# Patient Record
Sex: FEMALE | Race: WHITE | NOT HISPANIC OR LATINO | Employment: UNEMPLOYED | URBAN - METROPOLITAN AREA
[De-identification: names, ages, dates, MRNs, and addresses within clinical notes are randomized per-mention and may not be internally consistent; named-entity substitution may affect disease eponyms.]

---

## 2017-01-30 ENCOUNTER — OFFICE VISIT (OUTPATIENT)
Dept: FAMILY MEDICINE CLINIC | Facility: CLINIC | Age: 67
End: 2017-01-30

## 2017-01-30 VITALS
SYSTOLIC BLOOD PRESSURE: 140 MMHG | TEMPERATURE: 98 F | BODY MASS INDEX: 37.27 KG/M2 | HEIGHT: 64 IN | OXYGEN SATURATION: 96 % | WEIGHT: 218.3 LBS | DIASTOLIC BLOOD PRESSURE: 74 MMHG | HEART RATE: 59 BPM

## 2017-01-30 DIAGNOSIS — E11.8 TYPE 2 DIABETES MELLITUS WITH COMPLICATION, UNSPECIFIED LONG TERM INSULIN USE STATUS: Primary | ICD-10-CM

## 2017-01-30 DIAGNOSIS — E11.8 TYPE 2 DIABETES MELLITUS WITH COMPLICATION, WITH LONG-TERM CURRENT USE OF INSULIN (HCC): ICD-10-CM

## 2017-01-30 DIAGNOSIS — E55.9 VITAMIN D DEFICIENCY: ICD-10-CM

## 2017-01-30 DIAGNOSIS — B37.9 YEAST INFECTION: Primary | ICD-10-CM

## 2017-01-30 DIAGNOSIS — Z79.4 TYPE 2 DIABETES MELLITUS WITH COMPLICATION, WITH LONG-TERM CURRENT USE OF INSULIN (HCC): ICD-10-CM

## 2017-01-30 LAB
EXPIRATION DATE: ABNORMAL
HBA1C MFR BLD: 9.5 %
Lab: ABNORMAL

## 2017-01-30 PROCEDURE — 36416 COLLJ CAPILLARY BLOOD SPEC: CPT | Performed by: NURSE PRACTITIONER

## 2017-01-30 PROCEDURE — 83036 HEMOGLOBIN GLYCOSYLATED A1C: CPT | Performed by: NURSE PRACTITIONER

## 2017-01-30 PROCEDURE — 99214 OFFICE O/P EST MOD 30 MIN: CPT | Performed by: NURSE PRACTITIONER

## 2017-01-30 RX ORDER — FLUCONAZOLE 100 MG/1
100 TABLET ORAL DAILY
Qty: 3 TABLET | Refills: 0 | Status: SHIPPED | OUTPATIENT
Start: 2017-01-30 | End: 2017-02-02

## 2017-01-30 RX ORDER — CLONIDINE HYDROCHLORIDE 0.1 MG/1
0.1 TABLET ORAL 2 TIMES DAILY
COMMUNITY
Start: 2017-01-11 | End: 2017-07-18 | Stop reason: DRUGHIGH

## 2017-01-30 RX ORDER — INSULIN DETEMIR 100 [IU]/ML
INJECTION, SOLUTION SUBCUTANEOUS
COMMUNITY
Start: 2017-01-27 | End: 2017-01-30 | Stop reason: SDUPTHER

## 2017-01-30 NOTE — PROGRESS NOTES
Subjective   Bee Tsang is a 66 y.o. female.     Diabetes   She presents for her follow-up diabetic visit. She has type 2 diabetes mellitus. No MedicAlert identification noted. Her disease course has been stable. There are no hypoglycemic associated symptoms. Associated symptoms include polydipsia. Pertinent negatives for diabetes include no blurred vision, no chest pain, no fatigue, no foot paresthesias, no polyphagia and no polyuria. There are no hypoglycemic complications. Symptoms are stable. There are no diabetic complications. Risk factors for coronary artery disease include dyslipidemia, diabetes mellitus, obesity and post-menopausal. Current diabetic treatment includes oral agent (monotherapy) and insulin injections. Her weight is decreasing steadily. She is following a generally healthy diet. Meal planning includes avoidance of concentrated sweets. She has not had a previous visit with a dietitian. She rarely participates in exercise. Her overall blood glucose range is 180-200 mg/dl. An ACE inhibitor/angiotensin II receptor blocker is contraindicated (allergy to ACEI). She does not see a podiatrist.Eye exam is not current.   Hypertension   Pertinent negatives include no blurred vision or chest pain.     I have reviewed the patient's medical history in detail and updated the computerized patient record.    The following portions of the patient's history were reviewed and updated as appropriate: allergies, current medications, past family history, past medical history, past social history, past surgical history and problem list.    Review of Systems   Constitutional: Negative.  Negative for fatigue.   Eyes: Negative.  Negative for blurred vision.   Respiratory: Negative.    Cardiovascular: Negative.  Negative for chest pain.   Endocrine: Positive for polydipsia. Negative for polyphagia and polyuria.   Musculoskeletal: Positive for arthralgias (of hips).   Skin: Negative.    Neurological: Negative.         Objective   Physical Exam   Constitutional: She is oriented to person, place, and time. She appears well-developed and well-nourished.   Eyes: Conjunctivae are normal. Pupils are equal, round, and reactive to light.   Neck: Normal range of motion. Neck supple. No JVD present. No tracheal deviation present. No thyromegaly present.   Cardiovascular: Normal rate, regular rhythm, normal heart sounds and intact distal pulses.    Pulmonary/Chest: Effort normal and breath sounds normal.   Lymphadenopathy:     She has no cervical adenopathy.   Neurological: She is alert and oriented to person, place, and time.   Skin: Skin is warm and dry.   Psychiatric:   No acute distress   Vitals reviewed.      Assessment/Plan   Bee was seen today for diabetes and hypertension.    Diagnoses and all orders for this visit:    Yeast infection  -     fluconazole (DIFLUCAN) 100 MG tablet; Take 1 tablet by mouth Daily for 3 days.    Type 2 diabetes mellitus with complication, with long-term current use of insulin  -     insulin detemir (LEVEMIR) 100 UNIT/ML injection; Inject 40 Units under the skin 2 (Two) Times a Day.  -     CBC (No Diff); Future  -     Basic Metabolic Panel; Future  -     Lipid Panel With / Chol / HDL Ratio; Future  -     Hemoglobin A1c; Future    Vitamin D deficiency  -     Vitamin D 25 Hydroxy; Future     1. Type 2 Diabetes. I have reviewed her BG log that she brought with her today. FBG are ranging from 130-180. NFBG are ranging from 185-225. Her A1C today is 9.5 which is up from 8.8 three months ago. She admits to eating a lot of sweets and she is not exercising at all. I will continue her oral medications and increase the Levemir to 40 units twice a day. We have reviewed the importance of nutrition and exercise in controlling her BG levels and her lipid levels. She has lost 8 lbs since her last visit.  2. She is to follow up in 3 months on her diabetes with the above fasting labs the week before.

## 2017-01-30 NOTE — MR AVS SNAPSHOT
Bee Tsang   1/30/2017 8:40 AM   Office Visit    Dept Phone:  [phone]   Encounter #:  [id number]    Provider:  RICHELLE Gibbs   Department:  [facility] INTERNAL MEDICINE                Your Full Care Plan              Today's Medication Changes          These changes are accurate as of: 1/30/17  9:25 AM.  If you have any questions, ask your nurse or doctor.               New Medication(s)Ordered:     fluconazole 100 MG tablet   Commonly known as:  DIFLUCAN   Take 1 tablet by mouth Daily for 3 days.   Started by:  RICHELLE Gibbs         Medication(s)that have changed:     CloNIDine 0.1 MG tablet   Commonly known as:  CATAPRES   Take 0.1 mg by mouth 2 (Two) Times a Day.   What changed:  Another medication with the same name was removed. Continue taking this medication, and follow the directions you see here.   Changed by:  RICHELLE Gibbs       insulin detemir 100 UNIT/ML injection   Commonly known as:  LEVEMIR   Inject 40 Units under the skin 2 (Two) Times a Day.   What changed:    - how much to take  - Another medication with the same name was removed. Continue taking this medication, and follow the directions you see here.   Changed by:  RICHELLE Gibbs         Stop taking medication(s)listed here:     cephalexin 500 MG capsule   Commonly known as:  KEFLEX   Stopped by:  RICHELLE Gibbs                Where to Get Your Medications      These medications were sent to [facility] [address] [place] [address] [phone] [facility] [phone] [address] [address]     Phone:  [phone]     fluconazole 100 MG tablet    insulin detemir 100 UNIT/ML injection                  Your Updated Medication List          This list is accurate as of: 1/30/17  9:25 AM.  Always use your most recent med list.                albuterol 108 (90 BASE) MCG/ACT inhaler   Commonly known as:  PROVENTIL  HFA;VENTOLIN HFA   Inhale 2 puffs Every 4 (Four) Hours As Needed for wheezing or shortness of air.       aspirin 81 MG EC tablet       atorvastatin 40 MG tablet   Commonly known as:  LIPITOR       buPROPion  MG 24 hr tablet   Commonly known as:  WELLBUTRIN XL       CloNIDine 0.1 MG tablet   Commonly known as:  CATAPRES       Empagliflozin 25 MG tablet   Commonly known as:  JARDIANCE   Take 1 tablet by mouth Daily.       fluconazole 100 MG tablet   Commonly known as:  DIFLUCAN   Take 1 tablet by mouth Daily for 3 days.       hydrochlorothiazide 25 MG tablet   Commonly known as:  HYDRODIURIL       insulin detemir 100 UNIT/ML injection   Commonly known as:  LEVEMIR   Inject 40 Units under the skin 2 (Two) Times a Day.       meloxicam 7.5 MG tablet   Commonly known as:  MOBIC   Take 1 tablet by mouth 2 (Two) Times a Day.       metFORMIN 1000 MG tablet   Commonly known as:  GLUCOPHAGE       metoprolol tartrate 100 MG tablet   Commonly known as:  LOPRESSOR               You Were Diagnosed With        Codes Comments    Yeast infection    -  Primary ICD-10-CM: B37.9  ICD-9-CM: 112.9     Type 2 diabetes mellitus with complication, with long-term current use of insulin     ICD-10-CM: E11.8, Z79.4  ICD-9-CM: 250.90, V58.67     Vitamin D deficiency     ICD-10-CM: E55.9  ICD-9-CM: 268.9       Instructions    .Fasting labs one week prior to next scheduled office visit.     Patient Instructions History      Upcoming Appointments     Visit Type Date Time Department    OFFICE VISIT 1/30/2017  8:40 AM CAITLYN KING      [employer] Signup     Our records indicate that you have declined [employer] signup. If you would like to sign up for [employer], please email [email or web address] or call [phone] to obtain an activation code.             Other Info from Your Visit           Allergies     Ace Inhibitors      Amlodipine      Amoxicillin      Ciprofloxacin      Hydralazine      Iodinated Diagnostic  "Agents      Nadolol      Penicillins      Reglan [Metoclopramide]      Joint pain    Sitagliptin      Sulfa Antibiotics        Reason for Visit     Diabetes F/U    Hypertension           Vital Signs     Blood Pressure Pulse Temperature Height Weight Oxygen Saturation    140/74 (BP Location: Left arm, Patient Position: Sitting, Cuff Size: Large Adult) 59 98 °F (36.7 °C) (Oral) 63.5\" (161.3 cm) 218 lb 4.8 oz (99 kg) 96%    Body Mass Index Smoking Status                38.06 kg/m2 Never Smoker          Problems and Diagnoses Noted     Yeast infection    -  Primary    Type 2 diabetes mellitus with complication, with long-term current use of insulin        Vitamin D deficiency            "

## 2017-02-28 RX ORDER — METOPROLOL TARTRATE 100 MG/1
100 TABLET ORAL 2 TIMES DAILY
Qty: 180 TABLET | Refills: 1 | Status: SHIPPED | OUTPATIENT
Start: 2017-02-28 | End: 2017-08-29 | Stop reason: SDUPTHER

## 2017-02-28 RX ORDER — BUPROPION HYDROCHLORIDE 300 MG/1
300 TABLET ORAL DAILY
Qty: 90 TABLET | Refills: 1 | Status: SHIPPED | OUTPATIENT
Start: 2017-02-28 | End: 2017-09-09 | Stop reason: SDUPTHER

## 2017-03-20 DIAGNOSIS — M19.90 ARTHRITIS: ICD-10-CM

## 2017-03-20 RX ORDER — MELOXICAM 7.5 MG/1
7.5 TABLET ORAL 2 TIMES DAILY
Qty: 60 TABLET | Refills: 3 | Status: SHIPPED | OUTPATIENT
Start: 2017-03-20 | End: 2017-08-01 | Stop reason: SDUPTHER

## 2017-04-01 ENCOUNTER — RESULTS ENCOUNTER (OUTPATIENT)
Dept: FAMILY MEDICINE CLINIC | Facility: CLINIC | Age: 67
End: 2017-04-01

## 2017-04-01 DIAGNOSIS — E11.8 TYPE 2 DIABETES MELLITUS WITH COMPLICATION, WITH LONG-TERM CURRENT USE OF INSULIN (HCC): ICD-10-CM

## 2017-04-01 DIAGNOSIS — Z79.4 TYPE 2 DIABETES MELLITUS WITH COMPLICATION, WITH LONG-TERM CURRENT USE OF INSULIN (HCC): ICD-10-CM

## 2017-04-01 DIAGNOSIS — E55.9 VITAMIN D DEFICIENCY: ICD-10-CM

## 2017-04-25 LAB
25(OH)D3+25(OH)D2 SERPL-MCNC: 26.3 NG/ML (ref 30–100)
BUN SERPL-MCNC: 19 MG/DL (ref 8–23)
BUN/CREAT SERPL: 27.1 (ref 7–25)
CALCIUM SERPL-MCNC: 10 MG/DL (ref 8.6–10.5)
CHLORIDE SERPL-SCNC: 97 MMOL/L (ref 98–107)
CHOLEST SERPL-MCNC: 290 MG/DL (ref 0–200)
CHOLEST/HDLC SERPL: 8.53 {RATIO}
CO2 SERPL-SCNC: 26.3 MMOL/L (ref 22–29)
CREAT SERPL-MCNC: 0.7 MG/DL (ref 0.57–1)
ERYTHROCYTE [DISTWIDTH] IN BLOOD BY AUTOMATED COUNT: 13.7 % (ref 11.7–13)
GLUCOSE SERPL-MCNC: 195 MG/DL (ref 65–99)
HBA1C MFR BLD: 8.42 % (ref 4.8–5.6)
HCT VFR BLD AUTO: 44.3 % (ref 35.6–45.5)
HDLC SERPL-MCNC: 34 MG/DL (ref 40–60)
HGB BLD-MCNC: 14.7 G/DL (ref 11.9–15.5)
LDLC SERPL CALC-MCNC: ABNORMAL MG/DL
MCH RBC QN AUTO: 30.9 PG (ref 26.9–32)
MCHC RBC AUTO-ENTMCNC: 33.2 G/DL (ref 32.4–36.3)
MCV RBC AUTO: 93.3 FL (ref 80.5–98.2)
PLATELET # BLD AUTO: 261 10*3/MM3 (ref 140–500)
POTASSIUM SERPL-SCNC: 4.5 MMOL/L (ref 3.5–5.2)
RBC # BLD AUTO: 4.75 10*6/MM3 (ref 3.9–5.2)
SODIUM SERPL-SCNC: 138 MMOL/L (ref 136–145)
TRIGL SERPL-MCNC: 735 MG/DL (ref 0–150)
VLDLC SERPL CALC-MCNC: ABNORMAL MG/DL
WBC # BLD AUTO: 9.28 10*3/MM3 (ref 4.5–10.7)

## 2017-05-01 ENCOUNTER — OFFICE VISIT (OUTPATIENT)
Dept: FAMILY MEDICINE CLINIC | Facility: CLINIC | Age: 67
End: 2017-05-01

## 2017-05-01 VITALS
WEIGHT: 216.2 LBS | TEMPERATURE: 98.6 F | OXYGEN SATURATION: 91 % | DIASTOLIC BLOOD PRESSURE: 78 MMHG | HEART RATE: 59 BPM | BODY MASS INDEX: 36.91 KG/M2 | HEIGHT: 64 IN | SYSTOLIC BLOOD PRESSURE: 132 MMHG

## 2017-05-01 DIAGNOSIS — B37.31 VAGINAL YEAST INFECTION: ICD-10-CM

## 2017-05-01 DIAGNOSIS — IMO0002 UNCONTROLLED TYPE 2 DIABETES MELLITUS WITH COMPLICATION, WITH LONG-TERM CURRENT USE OF INSULIN: ICD-10-CM

## 2017-05-01 DIAGNOSIS — E78.5 DYSLIPIDEMIA: ICD-10-CM

## 2017-05-01 DIAGNOSIS — I10 ESSENTIAL HYPERTENSION: Primary | ICD-10-CM

## 2017-05-01 PROCEDURE — 99214 OFFICE O/P EST MOD 30 MIN: CPT | Performed by: NURSE PRACTITIONER

## 2017-05-01 RX ORDER — SIMVASTATIN 20 MG
20 TABLET ORAL NIGHTLY
Qty: 30 TABLET | Refills: 5 | Status: SHIPPED | OUTPATIENT
Start: 2017-05-01 | End: 2017-06-05 | Stop reason: SINTOL

## 2017-05-01 RX ORDER — FLUCONAZOLE 100 MG/1
100 TABLET ORAL DAILY
Qty: 3 TABLET | Refills: 0 | Status: SHIPPED | OUTPATIENT
Start: 2017-05-01 | End: 2017-05-04

## 2017-05-01 RX ORDER — FENOFIBRATE 48 MG/1
48 TABLET, COATED ORAL DAILY
Qty: 30 TABLET | Refills: 5 | Status: SHIPPED | OUTPATIENT
Start: 2017-05-01 | End: 2017-06-05 | Stop reason: SINTOL

## 2017-06-05 ENCOUNTER — OFFICE VISIT (OUTPATIENT)
Dept: FAMILY MEDICINE CLINIC | Facility: CLINIC | Age: 67
End: 2017-06-05

## 2017-06-05 VITALS
BODY MASS INDEX: 38.6 KG/M2 | TEMPERATURE: 98.2 F | HEART RATE: 57 BPM | WEIGHT: 226.1 LBS | SYSTOLIC BLOOD PRESSURE: 138 MMHG | DIASTOLIC BLOOD PRESSURE: 90 MMHG | HEIGHT: 64 IN | OXYGEN SATURATION: 97 %

## 2017-06-05 DIAGNOSIS — E78.5 HYPERLIPIDEMIA, UNSPECIFIED HYPERLIPIDEMIA TYPE: ICD-10-CM

## 2017-06-05 DIAGNOSIS — IMO0002 UNCONTROLLED TYPE 2 DIABETES MELLITUS WITH COMPLICATION, WITH LONG-TERM CURRENT USE OF INSULIN: Primary | ICD-10-CM

## 2017-06-05 DIAGNOSIS — E78.5 DYSLIPIDEMIA: ICD-10-CM

## 2017-06-05 PROCEDURE — 99214 OFFICE O/P EST MOD 30 MIN: CPT | Performed by: NURSE PRACTITIONER

## 2017-06-05 NOTE — PROGRESS NOTES
Subjective   Bee Tsang is a 66 y.o. female who presents today for:    Diabetes (4 week f/u) and Medication Reaction (Quit taking fenofibrate and simvastatin. Unsure which one caused reaction)    Diabetes   She presents for her follow-up diabetic visit. She has type 2 diabetes mellitus. Her disease course has been fluctuating. There are no hypoglycemic associated symptoms. Associated symptoms include polydipsia and polyuria. Pertinent negatives for diabetes include no blurred vision, no chest pain, no foot paresthesias and no visual change. There are no hypoglycemic complications. Symptoms are stable. There are no diabetic complications. Risk factors for coronary artery disease include dyslipidemia, diabetes mellitus, obesity and post-menopausal. Current diabetic treatment includes insulin injections and oral agent (monotherapy). She is compliant with treatment most of the time. Her weight is increasing steadily. She is following a generally healthy diet. She has not had a previous visit with a dietitian. She participates in exercise intermittently. Her breakfast blood glucose is taken between 7-8 am. Her breakfast blood glucose range is generally 130-140 mg/dl. Her lunch blood glucose is taken between 2-3 pm. Her lunch blood glucose range is generally 180-200 mg/dl. Her dinner blood glucose is taken after 8 pm. Her dinner blood glucose range is generally >200 mg/dl. Her highest blood glucose is >200 mg/dl. Her overall blood glucose range is 140-180 mg/dl. An ACE inhibitor/angiotensin II receptor blocker is not being taken. She does not see a podiatrist.Eye exam is not current.      I have reviewed the patient's medical history in detail and updated the computerized patient record.    Ms. Tsang  reports that she has never smoked. She has never used smokeless tobacco. She reports that she does not drink alcohol or use illicit drugs.         Current Outpatient Prescriptions:   •  albuterol (PROVENTIL  "HFA;VENTOLIN HFA) 108 (90 BASE) MCG/ACT inhaler, Inhale 2 puffs Every 4 (Four) Hours As Needed for wheezing or shortness of air., Disp: 18 g, Rfl: 3  •  aspirin 81 MG EC tablet, Take 81 mg by mouth daily., Disp: , Rfl:   •  buPROPion XL (WELLBUTRIN XL) 300 MG 24 hr tablet, Take 1 tablet by mouth Daily., Disp: 90 tablet, Rfl: 1  •  CloNIDine (CATAPRES) 0.1 MG tablet, Take 0.1 mg by mouth 2 (Two) Times a Day., Disp: , Rfl:   •  hydrochlorothiazide (HYDRODIURIL) 25 MG tablet, Take 1 tablet by mouth daily., Disp: , Rfl:   •  insulin detemir (LEVEMIR FLEXTOUCH) 100 UNIT/ML injection, Inject 45 Units under the skin 2 (Two) Times a Day., Disp: 5 pen, Rfl: 5  •  Insulin Pen Needle 32G X 4 MM misc, Use to inject Levemir BID, Disp: 100 each, Rfl: 1  •  meloxicam (MOBIC) 7.5 MG tablet, Take 1 tablet by mouth 2 (Two) Times a Day., Disp: 60 tablet, Rfl: 3  •  metFORMIN (GLUCOPHAGE) 1000 MG tablet, Take 1 tablet by mouth 2 (Two) Times a Day., Disp: , Rfl:   •  metoprolol tartrate (LOPRESSOR) 100 MG tablet, Take 1 tablet by mouth 2 (Two) Times a Day., Disp: 180 tablet, Rfl: 1      The following portions of the patient's history were reviewed and updated as appropriate: allergies, current medications, past social history and problem list.    Review of Systems   Constitutional: Negative.    Eyes: Negative for blurred vision.   Respiratory: Negative.    Cardiovascular: Negative.  Negative for chest pain, palpitations and leg swelling.   Endocrine: Positive for polydipsia and polyuria.   Musculoskeletal: Positive for back pain (sciatic on left side).   Skin: Negative.    Neurological: Negative.          Objective   Vitals:    06/05/17 0803   BP: 138/90   BP Location: Left arm   Patient Position: Sitting   Cuff Size: Large Adult   Pulse: 57   Temp: 98.2 °F (36.8 °C)   TempSrc: Oral   SpO2: 97%   Weight: 226 lb 1.6 oz (103 kg)   Height: 63.5\" (161.3 cm)     Physical Exam   Constitutional: She is oriented to person, place, and time. She " appears well-developed and well-nourished.   Eyes: Conjunctivae and EOM are normal. Pupils are equal, round, and reactive to light.   Neck: Normal range of motion. Neck supple. No JVD present. No tracheal deviation present. No thyromegaly present.   Cardiovascular: Normal rate, regular rhythm, normal heart sounds and intact distal pulses.  Exam reveals no gallop and no friction rub.    No murmur heard.  Pulmonary/Chest: Effort normal and breath sounds normal. No respiratory distress. She has no wheezes. She has no rales.   Lymphadenopathy:     She has no cervical adenopathy.   Neurological: She is alert and oriented to person, place, and time.   Skin: Skin is warm and dry.   Psychiatric:   No acute distress   Vitals reviewed.        Assessment/Plan   Bee was seen today for diabetes and medication reaction.    Diagnoses and all orders for this visit:    Uncontrolled type 2 diabetes mellitus with complication, with long-term current use of insulin    Hyperlipidemia, unspecified hyperlipidemia type    1. I have reviewed her blood glucose log she brought with her today. Her fasting glucose levels are ranging from 120 to 149 and her non-fasting blood glucose levels are ranging from 175 to 215. She is to continue Metformin 1000 mg twice daily and increase the Levemir to 50 units daily. She is to continue testing her BG levels daily, alternating between breakfast, lunch, supper and bedtime. We discussed having her schedule an eye exam since it has been over a year.  2. She was started on Simvastatin and Fenofibrate at her last visit. She reports she was having generalized body aches so she stopped both medications. We have agreed that she should stop taking the Simvastatin and resume the Fenofibrate.   3. She is to follow up in 3 months on her blood glucose control.

## 2017-07-18 ENCOUNTER — OFFICE VISIT (OUTPATIENT)
Dept: FAMILY MEDICINE CLINIC | Facility: CLINIC | Age: 67
End: 2017-07-18

## 2017-07-18 VITALS
DIASTOLIC BLOOD PRESSURE: 88 MMHG | SYSTOLIC BLOOD PRESSURE: 130 MMHG | OXYGEN SATURATION: 95 % | HEART RATE: 59 BPM | HEIGHT: 64 IN | BODY MASS INDEX: 38.22 KG/M2 | WEIGHT: 223.9 LBS | TEMPERATURE: 98.2 F

## 2017-07-18 DIAGNOSIS — I10 ESSENTIAL HYPERTENSION: Primary | ICD-10-CM

## 2017-07-18 PROCEDURE — 99213 OFFICE O/P EST LOW 20 MIN: CPT | Performed by: NURSE PRACTITIONER

## 2017-07-18 RX ORDER — CLONIDINE HYDROCHLORIDE 0.2 MG/1
0.2 TABLET ORAL 2 TIMES DAILY
Qty: 120 TABLET | Refills: 1 | Status: SHIPPED | OUTPATIENT
Start: 2017-07-18 | End: 2018-01-05 | Stop reason: SDUPTHER

## 2017-07-18 RX ORDER — FENOFIBRATE 48 MG/1
48 TABLET, COATED ORAL DAILY
COMMUNITY
Start: 2017-06-21 | End: 2017-12-12 | Stop reason: SDUPTHER

## 2017-07-18 RX ORDER — SENNOSIDES 8.6 MG
650 CAPSULE ORAL EVERY 8 HOURS PRN
COMMUNITY

## 2017-07-18 RX ORDER — HYDROCODONE BITARTRATE AND ACETAMINOPHEN 5; 325 MG/1; MG/1
TABLET ORAL AS NEEDED
COMMUNITY
Start: 2017-06-26 | End: 2017-10-30

## 2017-07-18 NOTE — PROGRESS NOTES
Subjective   Bee Tsang is a 66 y.o. female who presents today for:    Hypertension (B/P has been elevated)    Hypertension   This is a chronic problem. The current episode started more than 1 year ago. Associated symptoms include headaches (slight headaches). Pertinent negatives include no blurred vision, chest pain, malaise/fatigue, palpitations, peripheral edema or shortness of breath. (Blood pressure has been elevated for the past 3 weeks and worsening) There are no associated agents to hypertension. Risk factors for coronary artery disease include diabetes mellitus, dyslipidemia, obesity and post-menopausal state. Past treatments include calcium channel blockers, beta blockers and diuretics. The current treatment provides moderate improvement. There are no compliance problems.       I have reviewed the patient's medical history in detail and updated the computerized patient record.    Ms. Tsang  reports that she has never smoked. She has never used smokeless tobacco. She reports that she does not drink alcohol or use illicit drugs.         Current Outpatient Prescriptions:   •  albuterol (PROVENTIL HFA;VENTOLIN HFA) 108 (90 BASE) MCG/ACT inhaler, Inhale 2 puffs Every 4 (Four) Hours As Needed for wheezing or shortness of air., Disp: 18 g, Rfl: 3  •  aspirin 81 MG EC tablet, Take 81 mg by mouth daily., Disp: , Rfl:   •  buPROPion XL (WELLBUTRIN XL) 300 MG 24 hr tablet, Take 1 tablet by mouth Daily., Disp: 90 tablet, Rfl: 1  •  CloNIDine (CATAPRES) 0.1 MG tablet, Take 0.1 mg by mouth 2 (Two) Times a Day., Disp: , Rfl:   •  fenofibrate (TRICOR) 48 MG tablet, Take 48 mg by mouth Daily., Disp: , Rfl:   •  hydrochlorothiazide (HYDRODIURIL) 25 MG tablet, Take 1 tablet by mouth daily., Disp: , Rfl:   •  HYDROcodone-acetaminophen (NORCO) 5-325 MG per tablet, Take  by mouth As Needed., Disp: , Rfl:   •  insulin detemir (LEVEMIR FLEXTOUCH) 100 UNIT/ML injection, Inject 45 Units under the skin 2 (Two) Times a Day.,  "Disp: 5 pen, Rfl: 5  •  Insulin Pen Needle 32G X 4 MM misc, Use to inject Levemir BID, Disp: 100 each, Rfl: 1  •  meloxicam (MOBIC) 7.5 MG tablet, Take 1 tablet by mouth 2 (Two) Times a Day., Disp: 60 tablet, Rfl: 3  •  metFORMIN (GLUCOPHAGE) 1000 MG tablet, Take 1 tablet by mouth 2 (Two) Times a Day., Disp: , Rfl:   •  metoprolol tartrate (LOPRESSOR) 100 MG tablet, Take 1 tablet by mouth 2 (Two) Times a Day., Disp: 180 tablet, Rfl: 1  •  EPINEPHrine (EPIPEN IJ), Inject  as directed., Disp: , Rfl:       The following portions of the patient's history were reviewed and updated as appropriate: allergies, current medications, past social history and problem list.    Review of Systems   Constitutional: Negative for malaise/fatigue.   Eyes: Negative for blurred vision.   Respiratory: Negative for shortness of breath.    Cardiovascular: Negative for chest pain and palpitations.   Neurological: Positive for headaches (slight headaches).         Objective   Vitals:    07/18/17 0854 07/18/17 0916   BP: 150/90 130/88   BP Location: Left arm    Patient Position: Sitting    Cuff Size: Large Adult    Pulse: 59    Temp: 98.2 °F (36.8 °C)    TempSrc: Oral    SpO2: 95%    Weight: 223 lb 14.4 oz (102 kg)    Height: 63.5\" (161.3 cm)      Physical Exam   Constitutional: She is oriented to person, place, and time. She appears well-developed and well-nourished.   Neck: Normal range of motion. Neck supple. No JVD present. No tracheal deviation present. No thyromegaly present.   Cardiovascular: Normal rate, regular rhythm, normal heart sounds and intact distal pulses.  Exam reveals no gallop and no friction rub.    No murmur heard.  Pulmonary/Chest: Effort normal and breath sounds normal. No respiratory distress. She has no wheezes. She has no rales. She exhibits no tenderness.   Musculoskeletal: Normal range of motion. She exhibits no edema.   Lymphadenopathy:     She has no cervical adenopathy.   Neurological: She is alert and oriented " to person, place, and time.   Skin: Skin is warm and dry.   Psychiatric:   No acute distress   Vitals reviewed.        Assessment/Plan   Bee was seen today for hypertension.    Diagnoses and all orders for this visit:    Essential hypertension  -     CloNIDine (CATAPRES) 0.2 MG tablet; Take 1 tablet by mouth 2 (Two) Times a Day.    Her blood pressure was 150/90 and 130/88 today in the office. I will increase the Clonidine to 0.2 mg twice daily. She is to continue with HCTZ 25 mg daily and Metorprolol 100 mg twice daily. We discussed increasing her daily activity to help control her blood pressure, manage her weight and blood glucose levels.     She is to return at her next scheduled appointment in 6 weeks.

## 2017-08-01 DIAGNOSIS — M19.90 ARTHRITIS: ICD-10-CM

## 2017-08-01 RX ORDER — MELOXICAM 7.5 MG/1
TABLET ORAL
Qty: 60 TABLET | Refills: 3 | Status: SHIPPED | OUTPATIENT
Start: 2017-08-01 | End: 2018-01-18 | Stop reason: SDUPTHER

## 2017-08-14 DIAGNOSIS — IMO0002 UNCONTROLLED TYPE 2 DIABETES MELLITUS WITH COMPLICATION, WITH LONG-TERM CURRENT USE OF INSULIN: ICD-10-CM

## 2017-08-14 RX ORDER — INSULIN DETEMIR 100 [IU]/ML
INJECTION, SOLUTION SUBCUTANEOUS
Qty: 9 PEN | Refills: 5 | Status: SHIPPED | OUTPATIENT
Start: 2017-08-14 | End: 2017-09-05 | Stop reason: ALTCHOICE

## 2017-08-29 RX ORDER — METOPROLOL TARTRATE 100 MG/1
TABLET ORAL
Qty: 180 TABLET | Refills: 1 | Status: SHIPPED | OUTPATIENT
Start: 2017-08-29 | End: 2018-02-25 | Stop reason: SDUPTHER

## 2017-09-05 ENCOUNTER — OFFICE VISIT (OUTPATIENT)
Dept: FAMILY MEDICINE CLINIC | Facility: CLINIC | Age: 67
End: 2017-09-05

## 2017-09-05 VITALS
BODY MASS INDEX: 38.67 KG/M2 | SYSTOLIC BLOOD PRESSURE: 142 MMHG | OXYGEN SATURATION: 95 % | HEART RATE: 63 BPM | WEIGHT: 226.5 LBS | TEMPERATURE: 97.9 F | DIASTOLIC BLOOD PRESSURE: 82 MMHG | HEIGHT: 64 IN

## 2017-09-05 DIAGNOSIS — IMO0002 UNCONTROLLED TYPE 2 DIABETES MELLITUS WITH COMPLICATION, WITH LONG-TERM CURRENT USE OF INSULIN: Primary | ICD-10-CM

## 2017-09-05 LAB
EXPIRATION DATE: ABNORMAL
HBA1C MFR BLD: 8.3 %
Lab: ABNORMAL

## 2017-09-05 PROCEDURE — 83036 HEMOGLOBIN GLYCOSYLATED A1C: CPT | Performed by: NURSE PRACTITIONER

## 2017-09-05 PROCEDURE — 99214 OFFICE O/P EST MOD 30 MIN: CPT | Performed by: NURSE PRACTITIONER

## 2017-09-05 PROCEDURE — 36416 COLLJ CAPILLARY BLOOD SPEC: CPT | Performed by: NURSE PRACTITIONER

## 2017-09-05 NOTE — PROGRESS NOTES
Subjective   Bee Tsang is a 66 y.o. female who presents today for:    Diabetes (3 month f/u) and Hypertension    Diabetes   She presents for her follow-up diabetic visit. She has type 2 diabetes mellitus. Her disease course has been stable. Associated symptoms include polydipsia and polyuria. Pertinent negatives for diabetes include no blurred vision, no fatigue, no foot paresthesias and no visual change. There are no hypoglycemic complications. Symptoms are stable. There are no diabetic complications. Risk factors for coronary artery disease include diabetes mellitus, dyslipidemia, hypertension, obesity and post-menopausal. When asked about meal planning, she reported none. She participates in exercise three times a week. Home blood sugar record trend: did not bring her log with her today. An ACE inhibitor/angiotensin II receptor blocker is not being taken. She does not see a podiatrist.Eye exam is current.      I have reviewed the patient's medical history in detail and updated the computerized patient record.    Ms. Tsang  reports that she has never smoked. She has never used smokeless tobacco. She reports that she does not drink alcohol or use illicit drugs.     Allergies   Allergen Reactions   • Ace Inhibitors    • Amlodipine    • Amoxicillin    • Angiotensin Receptor Blockers    • Calcium Channel Blockers    • Ciprofloxacin    • Hydralazine    • Iodinated Diagnostic Agents    • Nadolol    • Penicillins    • Reglan [Metoclopramide]      Joint pain   • Simvastatin    • Sitagliptin    • Sulfa Antibiotics        Current Outpatient Prescriptions:   •  acetaminophen (TYLENOL ARTHRITIS PAIN) 650 MG 8 hr tablet, Take 650 mg by mouth Every 8 (Eight) Hours As Needed for Mild Pain (1-3)., Disp: , Rfl:   •  albuterol (PROVENTIL HFA;VENTOLIN HFA) 108 (90 BASE) MCG/ACT inhaler, Inhale 2 puffs Every 4 (Four) Hours As Needed for wheezing or shortness of air., Disp: 18 g, Rfl: 3  •  aspirin 81 MG EC tablet, Take 81  "mg by mouth daily., Disp: , Rfl:   •  buPROPion XL (WELLBUTRIN XL) 300 MG 24 hr tablet, Take 1 tablet by mouth Daily., Disp: 90 tablet, Rfl: 1  •  CloNIDine (CATAPRES) 0.2 MG tablet, Take 1 tablet by mouth 2 (Two) Times a Day., Disp: 120 tablet, Rfl: 1  •  fenofibrate (TRICOR) 48 MG tablet, Take 48 mg by mouth Daily., Disp: , Rfl:   •  hydrochlorothiazide (HYDRODIURIL) 25 MG tablet, Take 1 tablet by mouth daily., Disp: , Rfl:   •  HYDROcodone-acetaminophen (NORCO) 5-325 MG per tablet, Take  by mouth As Needed., Disp: , Rfl:   •  Insulin Pen Needle 32G X 4 MM misc, Use to inject Levemir BID, Disp: 100 each, Rfl: 1  •  LEVEMIR FLEXTOUCH 100 UNIT/ML injection, INJECT 45 UNITS SUBCUTANEOUSLY TWICE DAILY, Disp: 9 pen, Rfl: 5  •  meloxicam (MOBIC) 7.5 MG tablet, TAKE ONE TABLET BY MOUTH TWICE DAILY, Disp: 60 tablet, Rfl: 3  •  metFORMIN (GLUCOPHAGE) 1000 MG tablet, Take 1 tablet by mouth 2 (Two) Times a Day., Disp: , Rfl:   •  metoprolol tartrate (LOPRESSOR) 100 MG tablet, TAKE ONE TABLET BY MOUTH TWICE DAILY, Disp: 180 tablet, Rfl: 1  •  EPINEPHrine (EPIPEN IJ), Inject  as directed., Disp: , Rfl:       Review of Systems   Constitutional: Negative.  Negative for fatigue.   Eyes: Negative for blurred vision.   Respiratory: Negative.    Cardiovascular: Negative.    Endocrine: Positive for polydipsia and polyuria.   Musculoskeletal: Positive for arthralgias (of hips).   Skin: Negative.    Neurological: Negative.          Objective   Vitals:    09/05/17 0823   BP: 142/82   BP Location: Right arm   Patient Position: Sitting   Cuff Size: Large Adult   Pulse: 63   Temp: 97.9 °F (36.6 °C)   TempSrc: Oral   SpO2: 95%   Weight: 226 lb 8 oz (103 kg)   Height: 63.5\" (161.3 cm)     Physical Exam   Constitutional: She is oriented to person, place, and time. She appears well-developed and well-nourished.   Cardiovascular: Normal rate, regular rhythm, normal heart sounds and intact distal pulses.  Exam reveals no gallop and no friction " rub.    No murmur heard.  Pulmonary/Chest: Effort normal and breath sounds normal. No respiratory distress. She has no wheezes. She has no rales.   Musculoskeletal: Normal range of motion. She exhibits no edema.   Neurological: She is alert and oriented to person, place, and time.   Skin: Skin is warm and dry.   Psychiatric:   No acute distress   Vitals reviewed.        Assessment/Plan   Bee was seen today for diabetes and hypertension.    Diagnoses and all orders for this visit:    Uncontrolled type 2 diabetes mellitus with complication, with long-term current use of insulin  -     Insulin Degludec 200 UNIT/ML solution pen-injector; Inject 90 Units under the skin Daily.  -     POCT glycated hemoglobin, total    1. Ms. Ha is here for a 3 month diabetes follow up. She did not bring her glucose log with her today. Her A1C is 8.3 which has not changed since the 8.4 3 months ago. I have discontinued the Levemir twice daily and I have started her on Tresiba 90 units daily. She is to continue with the Metformin 1000 mg twice a day. I have discussed with her the importance of testing her glucose levels daily so that we can see how the insulin is affecting her glucose levels.  2. She is to return in 4 weeks to follow up on her glucose levels and she is to bring her glucose log with her.

## 2017-09-11 RX ORDER — BUPROPION HYDROCHLORIDE 300 MG/1
TABLET ORAL
Qty: 90 TABLET | Refills: 1 | Status: SHIPPED | OUTPATIENT
Start: 2017-09-11 | End: 2018-02-25 | Stop reason: SDUPTHER

## 2017-10-30 ENCOUNTER — OFFICE VISIT (OUTPATIENT)
Dept: FAMILY MEDICINE CLINIC | Facility: CLINIC | Age: 67
End: 2017-10-30

## 2017-10-30 VITALS
BODY MASS INDEX: 38.51 KG/M2 | DIASTOLIC BLOOD PRESSURE: 80 MMHG | HEIGHT: 64 IN | OXYGEN SATURATION: 97 % | SYSTOLIC BLOOD PRESSURE: 140 MMHG | WEIGHT: 225.6 LBS | HEART RATE: 61 BPM | TEMPERATURE: 97.8 F

## 2017-10-30 DIAGNOSIS — S99.829A TOENAIL TORN AWAY: ICD-10-CM

## 2017-10-30 DIAGNOSIS — M54.2 NECK PAIN: ICD-10-CM

## 2017-10-30 DIAGNOSIS — N39.0 URINARY TRACT INFECTION WITHOUT HEMATURIA, SITE UNSPECIFIED: Primary | ICD-10-CM

## 2017-10-30 PROCEDURE — 99214 OFFICE O/P EST MOD 30 MIN: CPT | Performed by: NURSE PRACTITIONER

## 2017-10-30 RX ORDER — INSULIN DETEMIR 100 [IU]/ML
INJECTION, SOLUTION SUBCUTANEOUS
COMMUNITY
Start: 2017-10-26 | End: 2018-03-05 | Stop reason: SDUPTHER

## 2017-10-30 RX ORDER — CHOLECALCIFEROL (VITAMIN D3) 125 MCG
1 CAPSULE ORAL
COMMUNITY

## 2017-10-30 RX ORDER — EPINEPHRINE 0.3 MG/.3ML
INJECTION SUBCUTANEOUS
COMMUNITY

## 2017-10-30 RX ORDER — SENNOSIDES 8.6 MG
CAPSULE ORAL
COMMUNITY
End: 2017-10-30 | Stop reason: SDUPTHER

## 2017-10-30 RX ORDER — CHLORAL HYDRATE 500 MG
1 CAPSULE ORAL
COMMUNITY

## 2017-10-30 RX ORDER — ASCORBIC ACID 500 MG
500 TABLET ORAL
COMMUNITY

## 2017-10-30 RX ORDER — ALBUTEROL SULFATE 90 UG/1
AEROSOL, METERED RESPIRATORY (INHALATION)
COMMUNITY
Start: 2016-12-13 | End: 2017-10-30 | Stop reason: SDUPTHER

## 2017-10-30 NOTE — PATIENT INSTRUCTIONS
You have been diagnosed with allergic rhinitis. Symptomatic treatment is best.  You may take Mucinex D for relieving congestion and cough.  If you have high blood pressure, do not take Mucinex D, instead opting for plain Mucinex and Coricidin HBP. Oral antihistamine, such as Allegra, Zyrtec or Claritin may help reduce ear pressure and relieve some nasal symptoms.  A saline nasal spray may be used to keep nose clear from discharge.  Be sure that you are increasing your intake of clear to decaffeinated fluids and get plenty of rest.  If your symptoms worsen or persist follow up as needed.

## 2017-10-30 NOTE — PROGRESS NOTES
Subjective   Bee Tsang is a 66 y.o. female who presents today for:    Facial Swelling (Rt side of jaw and down neck) and Toe Pain (Ripped toe nail)    HPI Comments: Ms. Tsang presents today complaining of neck swelling and pain since this morning. She states that when she woke up this morning she thought the right side of her neck felt swollen and asymmetrical to the left, and she felt tenderness directly under her jawline. She states that she wanted to come in to make sure that she is not developing any type of infection, because she is scheduled to have hip surgery in November. She also would like her left great toe inspected. She states that her toenail was snagging on her socks, so she clipped it, and about half of the nail came off. She states that it does not hurt and doesn't look inflamed, but she would like it inspected to rule out infection. She has been putting triple antibiotic ointment on the toe and keeping it covered with a bandage when wearing shoes.     I have reviewed the patient's medical history in detail and updated the computerized patient record.    Ms. Tsang  reports that she has never smoked. She has never used smokeless tobacco. She reports that she does not drink alcohol or use illicit drugs.     Allergies   Allergen Reactions   • Ace Inhibitors Shortness Of Breath and Swelling   • Amlodipine Anaphylaxis   • Angiotensin Receptor Blockers Anaphylaxis   • Calcium Channel Blockers Anaphylaxis   • Hydralazine Anaphylaxis   • Nadolol Swelling   • Other Anaphylaxis     HAND  (SHE CAN BE AROUND IT BUT CANNOT USE IT HERSELF)--CAUSED TONGUE SWELLING   • Amoxicillin Hives     PT STS SHE CAN TAKE KEFLEX   • Ciprofloxacin      SEVERE JOINT PAIN   • Empagliflozin    • Iodinated Diagnostic Agents    • Reglan [Metoclopramide]      Joint pain   • Simvastatin    • Sitagliptin    • Sulfa Antibiotics    • Penicillins Rash       Current Outpatient Prescriptions:   •  acetaminophen  (TYLENOL ARTHRITIS PAIN) 650 MG 8 hr tablet, Take 650 mg by mouth Every 8 (Eight) Hours As Needed for Mild Pain (1-3)., Disp: , Rfl:   •  albuterol (PROVENTIL HFA;VENTOLIN HFA) 108 (90 BASE) MCG/ACT inhaler, Inhale 2 puffs Every 4 (Four) Hours As Needed for wheezing or shortness of air., Disp: 18 g, Rfl: 3  •  aspirin 81 MG EC tablet, Take 81 mg by mouth daily., Disp: , Rfl:   •  B Complex Vitamins (B COMPLEX-B12 PO), Take 1 tablet by mouth., Disp: , Rfl:   •  buPROPion XL (WELLBUTRIN XL) 300 MG 24 hr tablet, TAKE ONE TABLET BY MOUTH ONCE DAILY, Disp: 90 tablet, Rfl: 1  •  Cholecalciferol (VITAMIN D) 1000 units tablet, Take 1,000 Units by mouth., Disp: , Rfl:   •  CloNIDine (CATAPRES) 0.2 MG tablet, Take 1 tablet by mouth 2 (Two) Times a Day., Disp: 120 tablet, Rfl: 1  •  fenofibrate (TRICOR) 48 MG tablet, Take 48 mg by mouth Daily., Disp: , Rfl:   •  hydrochlorothiazide (HYDRODIURIL) 25 MG tablet, Take 1 tablet by mouth daily., Disp: , Rfl:   •  Insulin Pen Needle 32G X 4 MM misc, Use to inject Levemir BID, Disp: 100 each, Rfl: 1  •  Lactobacillus (PROBIOTIC ACIDOPHILUS) capsule, Take 1 capsule by mouth., Disp: , Rfl:   •  LEVEMIR FLEXTOUCH 100 UNIT/ML injection, , Disp: , Rfl:   •  meloxicam (MOBIC) 7.5 MG tablet, TAKE ONE TABLET BY MOUTH TWICE DAILY, Disp: 60 tablet, Rfl: 3  •  metFORMIN (GLUCOPHAGE) 1000 MG tablet, Take 1 tablet by mouth 2 (Two) Times a Day., Disp: , Rfl:   •  metoprolol tartrate (LOPRESSOR) 100 MG tablet, TAKE ONE TABLET BY MOUTH TWICE DAILY, Disp: 180 tablet, Rfl: 1  •  mupirocin (BACTROBAN) 2 % ointment, , Disp: , Rfl:   •  Omega-3 Fatty Acids (FISH OIL) 1000 MG capsule capsule, Take 1 capsule by mouth., Disp: , Rfl:   •  vitamin C (ASCORBIC ACID) 500 MG tablet, Take 500 mg by mouth., Disp: , Rfl:   •  EPINEPHrine (EPIPEN) 0.3 MG/0.3ML solution auto-injector injection, Inject  as directed., Disp: , Rfl:       Review of Systems   Constitutional: Negative for activity change, chills,  "diaphoresis, fatigue and fever.   HENT: Positive for facial swelling (swelling under right jaw). Negative for congestion, drooling, mouth sores, postnasal drip, rhinorrhea, sinus pain, sinus pressure and sore throat.    Eyes: Negative for pain, discharge, itching and visual disturbance.   Respiratory: Negative for cough, chest tightness, shortness of breath and wheezing.    Cardiovascular: Negative for chest pain, palpitations and leg swelling.   Gastrointestinal: Negative for abdominal distention, abdominal pain, constipation, diarrhea, nausea and vomiting.   Endocrine: Negative for polydipsia, polyphagia and polyuria.   Genitourinary: Negative for difficulty urinating, dysuria, frequency and urgency.   Musculoskeletal: Negative.    Skin: Negative for color change, pallor and rash.        Right great toenail tear   Neurological: Negative.    Psychiatric/Behavioral: Negative.          Objective   Vitals:    10/30/17 1443   BP: 140/80   BP Location: Right arm   Patient Position: Sitting   Cuff Size: Large Adult   Pulse: 61   Temp: 97.8 °F (36.6 °C)   TempSrc: Oral   SpO2: 97%   Weight: 225 lb 9.6 oz (102 kg)   Height: 63.5\" (161.3 cm)     Physical Exam   Constitutional: She is oriented to person, place, and time. She appears well-developed and well-nourished.   HENT:   Head: Normocephalic and atraumatic.   Right Ear: Hearing, tympanic membrane, external ear and ear canal normal.   Left Ear: Hearing, tympanic membrane, external ear and ear canal normal.   Nose: Nose normal. No mucosal edema, rhinorrhea or sinus tenderness. Right sinus exhibits no maxillary sinus tenderness and no frontal sinus tenderness. Left sinus exhibits no maxillary sinus tenderness and no frontal sinus tenderness.   Mouth/Throat: Uvula is midline, oropharynx is clear and moist and mucous membranes are normal. No oral lesions. Normal dentition. No dental abscesses. No oropharyngeal exudate.   Eyes: Conjunctivae and EOM are normal. Pupils are " equal, round, and reactive to light. Right eye exhibits no discharge. Left eye exhibits no discharge.   Neck: Trachea normal, normal range of motion and full passive range of motion without pain. Neck supple. No JVD present. Carotid bruit is not present. No tracheal deviation present. No thyroid mass and no thyromegaly present.   Neck appears symmetrical, no cervical adenopathy, no masses present, thyroid WNL   Cardiovascular: Normal rate, regular rhythm and normal heart sounds.    No murmur heard.  Pulmonary/Chest: Effort normal and breath sounds normal. No stridor. No respiratory distress. She has no wheezes. She has no rales. She exhibits no tenderness.   Musculoskeletal: Normal range of motion. She exhibits no edema or tenderness.   Lymphadenopathy:     She has no cervical adenopathy.   Neurological: She is alert and oriented to person, place, and time.   Skin: Skin is warm and dry.   Left great toenail torn, no signs of infection, pink, cool to touch   Psychiatric:   No acute abnormality   Vitals reviewed.        Assessment/Plan   Bee was seen today for facial swelling and toe pain.    Diagnoses and all orders for this visit:    Urinary tract infection without hematuria, site unspecified  -     Urine Culture - Urine, Urine, Clean Catch    Neck pain    Toenail torn away    1. Neck pain: I see no evidence of swelling, thyroid abnormality, or lymph node swelling/aenopathy. I believe the tenderness she felt this morning may be related to a lymph node that is tender but not swollen, and may be a reaction to an allergen or virus that she has been exposed to. Educated patient to watch for redness, heat, or swelling, and to call immediately with any of these symptoms.    2: Torn toenail: I have advised her to keep a close eye on the healing of this nail, as she is diabetic. Continue to apply triple antibiotic ointment and cover with bandage when wearing shoes, but leave the nail open to air when at home and sitting  down.     3. Collected urine specimen from patient, as her orthopedic provider has requested, to rule out UTI before her hip surgery next month. Patient has no urinary symptoms or complaints at this time.     4. Follow up as needed, and at next scheduled appointment.

## 2017-11-01 LAB
BACTERIA UR CULT: NORMAL
BACTERIA UR CULT: NORMAL

## 2017-12-05 ENCOUNTER — OFFICE VISIT (OUTPATIENT)
Dept: FAMILY MEDICINE CLINIC | Facility: CLINIC | Age: 67
End: 2017-12-05

## 2017-12-05 VITALS
WEIGHT: 220.7 LBS | TEMPERATURE: 98.1 F | OXYGEN SATURATION: 96 % | DIASTOLIC BLOOD PRESSURE: 78 MMHG | HEART RATE: 60 BPM | HEIGHT: 64 IN | BODY MASS INDEX: 37.68 KG/M2 | SYSTOLIC BLOOD PRESSURE: 142 MMHG

## 2017-12-05 DIAGNOSIS — IMO0002 UNCONTROLLED TYPE 2 DIABETES MELLITUS WITH COMPLICATION, WITH LONG-TERM CURRENT USE OF INSULIN: Primary | ICD-10-CM

## 2017-12-05 LAB
EXPIRATION DATE: ABNORMAL
HBA1C MFR BLD: 7.4 %
Lab: ABNORMAL

## 2017-12-05 PROCEDURE — 99213 OFFICE O/P EST LOW 20 MIN: CPT | Performed by: NURSE PRACTITIONER

## 2017-12-05 PROCEDURE — 36416 COLLJ CAPILLARY BLOOD SPEC: CPT | Performed by: NURSE PRACTITIONER

## 2017-12-05 PROCEDURE — 83036 HEMOGLOBIN GLYCOSYLATED A1C: CPT | Performed by: NURSE PRACTITIONER

## 2017-12-05 RX ORDER — ONDANSETRON 4 MG/1
4 TABLET, FILM COATED ORAL
COMMUNITY
Start: 2017-11-14

## 2017-12-05 RX ORDER — HYDROCODONE BITARTRATE AND ACETAMINOPHEN 7.5; 325 MG/1; MG/1
1-2 TABLET ORAL
COMMUNITY
Start: 2017-11-08 | End: 2018-06-05

## 2017-12-05 NOTE — PROGRESS NOTES
Subjective   Bee Tsang is a 67 y.o. female.     Diabetes (3 month f/u)     HPI Comments: Ms. Hobbs presents today for her 3 month follow up on her diabetes. She did not bring her glucose log with her today. She states that her levels are improving and that this morning her fasting glucose was in the 120s. She is status post hip replacement and states she has had no sciatic pain since the surgery. She is to complete physical therapy within the next week and hopes that she can return to doing water aerobics within a month.    I have reviewed the patient's medical history in detail and updated the computerized patient record.    The following portions of the patient's history were reviewed and updated as appropriate: allergies, current medications, past family history, past medical history, past social history, past surgical history and problem list.       Current Outpatient Prescriptions:   •  acetaminophen (TYLENOL ARTHRITIS PAIN) 650 MG 8 hr tablet, Take 650 mg by mouth Every 8 (Eight) Hours As Needed for Mild Pain (1-3)., Disp: , Rfl:   •  albuterol (PROVENTIL HFA;VENTOLIN HFA) 108 (90 BASE) MCG/ACT inhaler, Inhale 2 puffs Every 4 (Four) Hours As Needed for wheezing or shortness of air., Disp: 18 g, Rfl: 3  •  aspirin 81 MG EC tablet, Take 81 mg by mouth daily., Disp: , Rfl:   •  B Complex Vitamins (B COMPLEX-B12 PO), Take 1 tablet by mouth., Disp: , Rfl:   •  buPROPion XL (WELLBUTRIN XL) 300 MG 24 hr tablet, TAKE ONE TABLET BY MOUTH ONCE DAILY, Disp: 90 tablet, Rfl: 1  •  Cholecalciferol (VITAMIN D) 1000 units tablet, Take 1,000 Units by mouth., Disp: , Rfl:   •  CloNIDine (CATAPRES) 0.2 MG tablet, Take 1 tablet by mouth 2 (Two) Times a Day., Disp: 120 tablet, Rfl: 1  •  fenofibrate (TRICOR) 48 MG tablet, Take 48 mg by mouth Daily., Disp: , Rfl:   •  hydrochlorothiazide (HYDRODIURIL) 25 MG tablet, Take 1 tablet by mouth daily., Disp: , Rfl:   •  HYDROcodone-acetaminophen (NORCO) 7.5-325 MG per tablet,  Take 1-2 tablets by mouth., Disp: , Rfl:   •  Insulin Pen Needle 32G X 4 MM misc, Use to inject Levemir BID, Disp: 100 each, Rfl: 1  •  Lactobacillus (PROBIOTIC ACIDOPHILUS) capsule, Take 1 capsule by mouth., Disp: , Rfl:   •  LEVEMIR FLEXTOUCH 100 UNIT/ML injection, , Disp: , Rfl:   •  meloxicam (MOBIC) 7.5 MG tablet, TAKE ONE TABLET BY MOUTH TWICE DAILY, Disp: 60 tablet, Rfl: 3  •  metFORMIN (GLUCOPHAGE) 1000 MG tablet, TAKE ONE TABLET BY MOUTH TWICE DAILY, Disp: 180 tablet, Rfl: 1  •  metoprolol tartrate (LOPRESSOR) 100 MG tablet, TAKE ONE TABLET BY MOUTH TWICE DAILY, Disp: 180 tablet, Rfl: 1  •  Omega-3 Fatty Acids (FISH OIL) 1000 MG capsule capsule, Take 1 capsule by mouth., Disp: , Rfl:   •  ondansetron (ZOFRAN) 4 MG tablet, Take 4 mg by mouth., Disp: , Rfl:   •  vitamin C (ASCORBIC ACID) 500 MG tablet, Take 500 mg by mouth., Disp: , Rfl:   •  EPINEPHrine (EPIPEN) 0.3 MG/0.3ML solution auto-injector injection, Inject  as directed., Disp: , Rfl:     Review of Systems   Constitutional: Negative.    Respiratory: Negative.    Cardiovascular: Negative.    Endocrine: Positive for polydipsia and polyuria. Negative for polyphagia.   Musculoskeletal: Negative.    Skin: Negative.    Neurological: Negative.    Psychiatric/Behavioral: Negative.        Objective    Vitals:    12/05/17 0810   BP: 142/78   Pulse: 60   Temp: 98.1 °F (36.7 °C)   SpO2: 96%     Physical Exam   Constitutional: She is oriented to person, place, and time. She appears well-developed and well-nourished.   Cardiovascular: Normal rate, regular rhythm, normal heart sounds and intact distal pulses.  Exam reveals no gallop and no friction rub.    No murmur heard.  Pulmonary/Chest: Effort normal and breath sounds normal. No respiratory distress. She has no wheezes. She has no rales.   Musculoskeletal: Normal range of motion. She exhibits no edema or tenderness.   Neurological: She is alert and oriented to person, place, and time.   Skin: Skin is warm and  dry.   Psychiatric:   No acute distress   Vitals reviewed.      Assessment/Plan   Bee was seen today for diabetes.    Diagnoses and all orders for this visit:    Uncontrolled type 2 diabetes mellitus with complication, with long-term current use of insulin  -     POC Glycated Hemoglobin, Total     1. Type 2 DM. Her A1C is 7.4 which is down form 8.3 three months ago. She is to continue with Levemir twice a day, and  Metformin 1000 mg twice a day.  I have asked her to continue monitoring her glucose levels daily alternating between breakfast, lunch, supper and bedtime.  2. She is to continue all other medications as prescribed.  3. Follow up in 3 months on BG control.

## 2017-12-12 DIAGNOSIS — E78.5 DYSLIPIDEMIA: ICD-10-CM

## 2017-12-12 RX ORDER — FENOFIBRATE 48 MG/1
TABLET, COATED ORAL
Qty: 30 TABLET | Refills: 5 | Status: SHIPPED | OUTPATIENT
Start: 2017-12-12 | End: 2018-08-15 | Stop reason: SDUPTHER

## 2018-01-05 DIAGNOSIS — I10 ESSENTIAL HYPERTENSION: ICD-10-CM

## 2018-01-05 RX ORDER — CLONIDINE HYDROCHLORIDE 0.2 MG/1
TABLET ORAL
Qty: 120 TABLET | Refills: 1 | Status: SHIPPED | OUTPATIENT
Start: 2018-01-05 | End: 2018-05-14 | Stop reason: SDUPTHER

## 2018-01-18 DIAGNOSIS — M19.90 ARTHRITIS: ICD-10-CM

## 2018-01-18 RX ORDER — MELOXICAM 7.5 MG/1
TABLET ORAL
Qty: 60 TABLET | Refills: 3 | Status: SHIPPED | OUTPATIENT
Start: 2018-01-18

## 2018-01-18 RX ORDER — HYDROCHLOROTHIAZIDE 25 MG/1
TABLET ORAL
Qty: 90 TABLET | Refills: 1 | Status: SHIPPED | OUTPATIENT
Start: 2018-01-18 | End: 2018-08-15 | Stop reason: SDUPTHER

## 2018-02-26 RX ORDER — METOPROLOL TARTRATE 100 MG/1
TABLET ORAL
Qty: 180 TABLET | Refills: 1 | Status: SHIPPED | OUTPATIENT
Start: 2018-02-26 | End: 2018-12-07 | Stop reason: SDUPTHER

## 2018-02-26 RX ORDER — BUPROPION HYDROCHLORIDE 300 MG/1
TABLET ORAL
Qty: 90 TABLET | Refills: 1 | Status: SHIPPED | OUTPATIENT
Start: 2018-02-26 | End: 2018-09-07 | Stop reason: SDUPTHER

## 2018-03-05 ENCOUNTER — OFFICE VISIT (OUTPATIENT)
Dept: FAMILY MEDICINE CLINIC | Facility: CLINIC | Age: 68
End: 2018-03-05

## 2018-03-05 VITALS
WEIGHT: 223.8 LBS | BODY MASS INDEX: 38.21 KG/M2 | TEMPERATURE: 98.3 F | SYSTOLIC BLOOD PRESSURE: 118 MMHG | OXYGEN SATURATION: 98 % | HEART RATE: 54 BPM | DIASTOLIC BLOOD PRESSURE: 76 MMHG | HEIGHT: 64 IN

## 2018-03-05 DIAGNOSIS — IMO0002 UNCONTROLLED TYPE 2 DIABETES MELLITUS WITH COMPLICATION, WITH LONG-TERM CURRENT USE OF INSULIN: Primary | ICD-10-CM

## 2018-03-05 DIAGNOSIS — E78.5 HYPERLIPIDEMIA, UNSPECIFIED HYPERLIPIDEMIA TYPE: ICD-10-CM

## 2018-03-05 DIAGNOSIS — I10 ESSENTIAL HYPERTENSION: ICD-10-CM

## 2018-03-05 LAB
EXPIRATION DATE: ABNORMAL
HBA1C MFR BLD: 8.4 %
Lab: ABNORMAL

## 2018-03-05 PROCEDURE — 99214 OFFICE O/P EST MOD 30 MIN: CPT | Performed by: NURSE PRACTITIONER

## 2018-03-05 PROCEDURE — 83036 HEMOGLOBIN GLYCOSYLATED A1C: CPT | Performed by: NURSE PRACTITIONER

## 2018-03-05 PROCEDURE — 36416 COLLJ CAPILLARY BLOOD SPEC: CPT | Performed by: NURSE PRACTITIONER

## 2018-03-05 RX ORDER — INSULIN DETEMIR 100 [IU]/ML
45 INJECTION, SOLUTION SUBCUTANEOUS 2 TIMES DAILY
Qty: 27 PEN | Refills: 2 | Status: SHIPPED | OUTPATIENT
Start: 2018-03-05 | End: 2018-06-03

## 2018-03-05 NOTE — PROGRESS NOTES
Subjective   Bee Tsang is a 67 y.o. female.     Hypertension   This is a chronic problem. The current episode started more than 1 year ago. The problem has been waxing and waning since onset. The problem is uncontrolled. Associated symptoms include peripheral edema (trace ankle edema at times). Pertinent negatives include no blurred vision, chest pain, palpitations or shortness of breath. There are no associated agents to hypertension. Risk factors for coronary artery disease include diabetes mellitus, dyslipidemia, obesity and post-menopausal state. Past treatments include diuretics and central alpha agonists. The current treatment provides mild improvement. There are no compliance problems.    Diabetes   She presents for her follow-up diabetic visit. She has type 2 diabetes mellitus. There are no hypoglycemic associated symptoms. Pertinent negatives for diabetes include no blurred vision and no chest pain. There are no hypoglycemic complications. She rarely participates in exercise. An ACE inhibitor/angiotensin II receptor blocker is contraindicated (allergice to ACE-I). She does not see a podiatrist.Eye exam is not current.     I have reviewed the patient's medical history in detail and updated the computerized patient record.    The following portions of the patient's history were reviewed and updated as appropriate: allergies, current medications, past family history, past medical history, past social history, past surgical history and problem list.       Current Outpatient Prescriptions:   •  acetaminophen (TYLENOL ARTHRITIS PAIN) 650 MG 8 hr tablet, Take 650 mg by mouth Every 8 (Eight) Hours As Needed for Mild Pain (1-3)., Disp: , Rfl:   •  albuterol (PROVENTIL HFA;VENTOLIN HFA) 108 (90 BASE) MCG/ACT inhaler, Inhale 2 puffs Every 4 (Four) Hours As Needed for wheezing or shortness of air., Disp: 18 g, Rfl: 3  •  aspirin 81 MG EC tablet, Take 81 mg by mouth daily., Disp: , Rfl:   •  B Complex Vitamins (B  COMPLEX-B12 PO), Take 1 tablet by mouth., Disp: , Rfl:   •  buPROPion XL (WELLBUTRIN XL) 300 MG 24 hr tablet, TAKE ONE TABLET BY MOUTH ONCE DAILY, Disp: 90 tablet, Rfl: 1  •  Cholecalciferol (VITAMIN D) 1000 units tablet, Take 1,000 Units by mouth., Disp: , Rfl:   •  CloNIDine (CATAPRES) 0.2 MG tablet, TAKE ONE TABLET BY MOUTH TWICE DAILY, Disp: 120 tablet, Rfl: 1  •  EPINEPHrine (EPIPEN) 0.3 MG/0.3ML solution auto-injector injection, Inject  as directed., Disp: , Rfl:   •  fenofibrate (TRICOR) 48 MG tablet, TAKE ONE TABLET BY MOUTH ONCE DAILY, Disp: 30 tablet, Rfl: 5  •  hydrochlorothiazide (HYDRODIURIL) 25 MG tablet, TAKE ONE TABLET BY MOUTH ONCE DAILY, Disp: 90 tablet, Rfl: 1  •  HYDROcodone-acetaminophen (NORCO) 7.5-325 MG per tablet, Take 1-2 tablets by mouth., Disp: , Rfl:   •  Insulin Pen Needle 32G X 4 MM misc, Use to inject Levemir BID, Disp: 100 each, Rfl: 1  •  Lactobacillus (PROBIOTIC ACIDOPHILUS) capsule, Take 1 capsule by mouth., Disp: , Rfl:   •  LEVEMIR FLEXTOUCH 100 UNIT/ML injection, Inject 45 Units under the skin 2 (Two) Times a Day for 90 days., Disp: 27 pen, Rfl: 2  •  meloxicam (MOBIC) 7.5 MG tablet, TAKE ONE TABLET BY MOUTH TWICE DAILY, Disp: 60 tablet, Rfl: 3  •  metFORMIN (GLUCOPHAGE) 1000 MG tablet, TAKE ONE TABLET BY MOUTH TWICE DAILY, Disp: 180 tablet, Rfl: 1  •  metoprolol tartrate (LOPRESSOR) 100 MG tablet, TAKE ONE TABLET BY MOUTH TWICE DAILY, Disp: 180 tablet, Rfl: 1  •  Omega-3 Fatty Acids (FISH OIL) 1000 MG capsule capsule, Take 1 capsule by mouth., Disp: , Rfl:   •  ondansetron (ZOFRAN) 4 MG tablet, Take 4 mg by mouth., Disp: , Rfl:   •  vitamin C (ASCORBIC ACID) 500 MG tablet, Take 500 mg by mouth., Disp: , Rfl:     Review of Systems   Constitutional: Negative.    Eyes: Negative for blurred vision.   Respiratory: Negative.  Negative for shortness of breath.    Cardiovascular: Negative.  Negative for chest pain and palpitations.   Skin: Negative.    Neurological: Negative.   "      Objective    Vitals:    03/05/18 0800 03/05/18 0831   BP: (!) 220/94 118/76   BP Location: Left arm    Patient Position: Sitting    Cuff Size: Large Adult    Pulse: 54    Temp: 98.3 °F (36.8 °C)    TempSrc: Oral    SpO2: 98%    Weight: 102 kg (223 lb 12.8 oz)    Height: 161.3 cm (63.5\")      Physical Exam   Constitutional: She is oriented to person, place, and time. She appears well-developed and well-nourished.   Cardiovascular: Normal rate, regular rhythm, normal heart sounds and intact distal pulses.  Exam reveals no gallop and no friction rub.    No murmur heard.  Pulmonary/Chest: Effort normal and breath sounds normal.   Musculoskeletal: She exhibits no edema.   Neurological: She is alert and oriented to person, place, and time.   Skin: Skin is warm and dry.   Psychiatric:   No acute distress   Vitals reviewed.      Assessment/Plan   Bee was seen today for hypertension and diabetes.    Diagnoses and all orders for this visit:    Uncontrolled type 2 diabetes mellitus with complication, with long-term current use of insulin  -     POC Glycated Hemoglobin, Total  -     LEVEMIR FLEXTOUCH 100 UNIT/ML injection; Inject 45 Units under the skin 2 (Two) Times a Day for 90 days.  -     CBC (No Diff); Future  -     Comprehensive Metabolic Panel; Future  -     Hemoglobin A1c; Future  -     Microalbumin / Creatinine Urine Ratio - Urine, Clean Catch; Future    Hyperlipidemia, unspecified hyperlipidemia type  -     Lipid Panel With / Chol / HDL Ratio; Future    Essential hypertension         1. Diabetes. She did not bring her glucose log with her today. She states her fasting glucose levels range between 120 to 150 and her non-fasting glucose levels are running around 200. Her A1C is 8.4 which is up from 7.4 three months ago. She has not gone back to water aerobics since her knee surgery. She is to continue with her medications as prescribed.   2. Hypertension. Her blood pressure today in the office was 220/94 and " 118/76. 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 She is to continue with her medications as prescribed.  3. We discussed that she needs to get back to doing water aerobics as this will help her stress, her blood pressure and her glucose levels. She has agreed.  4. Return in 3 months with fasting labs the week before.

## 2018-05-14 DIAGNOSIS — I10 ESSENTIAL HYPERTENSION: ICD-10-CM

## 2018-05-14 RX ORDER — CLONIDINE HYDROCHLORIDE 0.2 MG/1
TABLET ORAL
Qty: 120 TABLET | Refills: 1 | Status: SHIPPED | OUTPATIENT
Start: 2018-05-14 | End: 2018-09-18 | Stop reason: SDUPTHER

## 2018-05-27 ENCOUNTER — RESULTS ENCOUNTER (OUTPATIENT)
Dept: FAMILY MEDICINE CLINIC | Facility: CLINIC | Age: 68
End: 2018-05-27

## 2018-05-27 DIAGNOSIS — E78.5 HYPERLIPIDEMIA, UNSPECIFIED HYPERLIPIDEMIA TYPE: ICD-10-CM

## 2018-05-27 DIAGNOSIS — IMO0002 UNCONTROLLED TYPE 2 DIABETES MELLITUS WITH COMPLICATION, WITH LONG-TERM CURRENT USE OF INSULIN: ICD-10-CM

## 2018-05-29 LAB
ALBUMIN SERPL-MCNC: 4.2 G/DL (ref 3.5–5.2)
ALBUMIN/GLOB SERPL: 1.5 G/DL
ALP SERPL-CCNC: 58 U/L (ref 39–117)
ALT SERPL-CCNC: 29 U/L (ref 1–33)
AST SERPL-CCNC: 21 U/L (ref 1–32)
BILIRUB SERPL-MCNC: 0.3 MG/DL (ref 0.1–1.2)
BUN SERPL-MCNC: 21 MG/DL (ref 8–23)
BUN/CREAT SERPL: 26.3 (ref 7–25)
CALCIUM SERPL-MCNC: 10.2 MG/DL (ref 8.6–10.5)
CHLORIDE SERPL-SCNC: 99 MMOL/L (ref 98–107)
CHOLEST SERPL-MCNC: 207 MG/DL (ref 0–200)
CHOLEST/HDLC SERPL: 5.18 {RATIO}
CO2 SERPL-SCNC: 27.4 MMOL/L (ref 22–29)
CREAT SERPL-MCNC: 0.8 MG/DL (ref 0.57–1)
ERYTHROCYTE [DISTWIDTH] IN BLOOD BY AUTOMATED COUNT: 13.8 % (ref 11.7–13)
GFR SERPLBLD CREATININE-BSD FMLA CKD-EPI: 72 ML/MIN/1.73
GFR SERPLBLD CREATININE-BSD FMLA CKD-EPI: 87 ML/MIN/1.73
GLOBULIN SER CALC-MCNC: 2.8 GM/DL
GLUCOSE SERPL-MCNC: 205 MG/DL (ref 65–99)
HBA1C MFR BLD: 8.7 % (ref 4.8–5.6)
HCT VFR BLD AUTO: 43.1 % (ref 35.6–45.5)
HDLC SERPL-MCNC: 40 MG/DL (ref 40–60)
HGB BLD-MCNC: 14.2 G/DL (ref 11.9–15.5)
LDLC SERPL CALC-MCNC: 101 MG/DL (ref 0–100)
MCH RBC QN AUTO: 30.3 PG (ref 26.9–32)
MCHC RBC AUTO-ENTMCNC: 32.9 G/DL (ref 32.4–36.3)
MCV RBC AUTO: 92.1 FL (ref 80.5–98.2)
PLATELET # BLD AUTO: 260 10*3/MM3 (ref 140–500)
POTASSIUM SERPL-SCNC: 4.4 MMOL/L (ref 3.5–5.2)
PROT SERPL-MCNC: 7 G/DL (ref 6–8.5)
RBC # BLD AUTO: 4.68 10*6/MM3 (ref 3.9–5.2)
SODIUM SERPL-SCNC: 141 MMOL/L (ref 136–145)
TRIGL SERPL-MCNC: 331 MG/DL (ref 0–150)
UNABLE TO VOID: NORMAL
VLDLC SERPL CALC-MCNC: 66.2 MG/DL (ref 5–40)
WBC # BLD AUTO: 8.85 10*3/MM3 (ref 4.5–10.7)

## 2018-06-02 LAB
ALBUMIN/CREAT UR: 58.8 MG/G CREAT (ref 0–30)
CREAT UR-MCNC: 84.4 MG/DL
MICROALBUMIN UR-MCNC: 49.6 UG/ML

## 2018-06-05 ENCOUNTER — OFFICE VISIT (OUTPATIENT)
Dept: FAMILY MEDICINE CLINIC | Facility: CLINIC | Age: 68
End: 2018-06-05

## 2018-06-05 VITALS
OXYGEN SATURATION: 97 % | BODY MASS INDEX: 38.57 KG/M2 | TEMPERATURE: 98.1 F | SYSTOLIC BLOOD PRESSURE: 142 MMHG | HEART RATE: 54 BPM | DIASTOLIC BLOOD PRESSURE: 82 MMHG | WEIGHT: 225.9 LBS | HEIGHT: 64 IN

## 2018-06-05 DIAGNOSIS — E78.5 HYPERLIPIDEMIA, UNSPECIFIED HYPERLIPIDEMIA TYPE: ICD-10-CM

## 2018-06-05 DIAGNOSIS — IMO0002 UNCONTROLLED TYPE 2 DIABETES MELLITUS WITH COMPLICATION, WITH LONG-TERM CURRENT USE OF INSULIN: Primary | ICD-10-CM

## 2018-06-05 PROCEDURE — 99213 OFFICE O/P EST LOW 20 MIN: CPT | Performed by: NURSE PRACTITIONER

## 2018-06-05 NOTE — PROGRESS NOTES
Subjective   Bee Tsang is a 67 y.o. female who presents today for:    Diabetes (3 month f/u and review labs)    Diabetes   She presents for her follow-up diabetic visit. She has type 2 diabetes mellitus. Her disease course has been fluctuating. There are no hypoglycemic associated symptoms. Associated symptoms include polydipsia and polyuria. Pertinent negatives for diabetes include no blurred vision, no chest pain, no visual change and no weakness. There are no hypoglycemic complications. Symptoms are stable. There are no diabetic complications. Risk factors for coronary artery disease include diabetes mellitus, dyslipidemia, hypertension, obesity, post-menopausal and sedentary lifestyle. She is compliant with treatment most of the time. Her weight is increasing steadily. Meal planning includes avoidance of concentrated sweets. She rarely participates in exercise. There is no change in her home blood glucose trend. (She does not test her glucose levels daily. She reports that when she does her fasting glucose levels are around 125. ) An ACE inhibitor/angiotensin II receptor blocker is not being taken.      I have reviewed the patient's medical history in detail and updated the computerized patient record.    Ms. Tsang  reports that she has never smoked. She has never used smokeless tobacco. She reports that she does not drink alcohol or use drugs.     Allergies   Allergen Reactions   • Ace Inhibitors Shortness Of Breath and Swelling   • Amlodipine Anaphylaxis   • Angiotensin Receptor Blockers Anaphylaxis   • Calcium Channel Blockers Anaphylaxis   • Hydralazine Anaphylaxis   • Nadolol Swelling   • Other Anaphylaxis     HAND  (SHE CAN BE AROUND IT BUT CANNOT USE IT HERSELF)--CAUSED TONGUE SWELLING   • Amoxicillin Hives     PT STS SHE CAN TAKE KEFLEX   • Empagliflozin    • Iodinated Diagnostic Agents    • Reglan [Metoclopramide]      Joint pain   • Simvastatin    • Sitagliptin    • Sulfa Antibiotics     • Victoza [Liraglutide] Other (See Comments)     Chest Pain   • Ciprofloxacin Other (See Comments)     SEVERE JOINT PAIN  SEVERE JOINT PAIN   • Penicillins Rash       Current Outpatient Prescriptions:   •  acetaminophen (TYLENOL ARTHRITIS PAIN) 650 MG 8 hr tablet, Take 650 mg by mouth Every 8 (Eight) Hours As Needed for Mild Pain (1-3)., Disp: , Rfl:   •  albuterol (PROVENTIL HFA;VENTOLIN HFA) 108 (90 BASE) MCG/ACT inhaler, Inhale 2 puffs Every 4 (Four) Hours As Needed for wheezing or shortness of air., Disp: 18 g, Rfl: 3  •  aspirin 81 MG EC tablet, Take 81 mg by mouth daily., Disp: , Rfl:   •  B Complex Vitamins (B COMPLEX-B12 PO), Take 1 tablet by mouth., Disp: , Rfl:   •  buPROPion XL (WELLBUTRIN XL) 300 MG 24 hr tablet, TAKE ONE TABLET BY MOUTH ONCE DAILY, Disp: 90 tablet, Rfl: 1  •  Cholecalciferol (VITAMIN D) 1000 units tablet, Take 1,000 Units by mouth., Disp: , Rfl:   •  CloNIDine (CATAPRES) 0.2 MG tablet, TAKE ONE TABLET BY MOUTH TWICE DAILY, Disp: 120 tablet, Rfl: 1  •  fenofibrate (TRICOR) 48 MG tablet, TAKE ONE TABLET BY MOUTH ONCE DAILY, Disp: 30 tablet, Rfl: 5  •  hydrochlorothiazide (HYDRODIURIL) 25 MG tablet, TAKE ONE TABLET BY MOUTH ONCE DAILY, Disp: 90 tablet, Rfl: 1  •  insulin detemir (LEVEMIR) 100 UNIT/ML injection, Inject 45 Units under the skin Daily., Disp: , Rfl:   •  Insulin Pen Needle 32G X 4 MM misc, Use to inject Levemir BID, Disp: 100 each, Rfl: 1  •  Lactobacillus (PROBIOTIC ACIDOPHILUS) capsule, Take 1 capsule by mouth., Disp: , Rfl:   •  meloxicam (MOBIC) 7.5 MG tablet, TAKE ONE TABLET BY MOUTH TWICE DAILY, Disp: 60 tablet, Rfl: 3  •  metFORMIN (GLUCOPHAGE) 1000 MG tablet, TAKE ONE TABLET BY MOUTH TWICE DAILY, Disp: 180 tablet, Rfl: 1  •  metoprolol tartrate (LOPRESSOR) 100 MG tablet, TAKE ONE TABLET BY MOUTH TWICE DAILY, Disp: 180 tablet, Rfl: 1  •  Omega-3 Fatty Acids (FISH OIL) 1000 MG capsule capsule, Take 1 capsule by mouth., Disp: , Rfl:   •  ondansetron (ZOFRAN) 4 MG  "tablet, Take 4 mg by mouth., Disp: , Rfl:   •  vitamin C (ASCORBIC ACID) 500 MG tablet, Take 500 mg by mouth., Disp: , Rfl:   •  EPINEPHrine (EPIPEN) 0.3 MG/0.3ML solution auto-injector injection, Inject  as directed., Disp: , Rfl:       Review of Systems   Constitutional: Negative.    Eyes: Negative for blurred vision.   Respiratory: Negative.    Cardiovascular: Negative.  Negative for chest pain.   Endocrine: Positive for polydipsia and polyuria.   Musculoskeletal: Negative.    Skin: Negative.    Neurological: Negative.  Negative for weakness.         Objective   Vitals:    06/05/18 0805   BP: 142/82   BP Location: Left arm   Patient Position: Sitting   Cuff Size: Large Adult   Pulse: 54   Temp: 98.1 °F (36.7 °C)   TempSrc: Oral   SpO2: 97%   Weight: 102 kg (225 lb 14.4 oz)   Height: 161.3 cm (63.5\")     Physical Exam   Constitutional: She is oriented to person, place, and time. She appears well-developed and well-nourished.   Cardiovascular: Normal rate, regular rhythm, normal heart sounds and intact distal pulses.    Pulmonary/Chest: Effort normal and breath sounds normal.   Neurological: She is alert and oriented to person, place, and time.   Skin: Skin is warm and dry.   Psychiatric:   No acute distress   Vitals reviewed.            Bee was seen today for diabetes.    Diagnoses and all orders for this visit:    Uncontrolled type 2 diabetes mellitus with complication, with long-term current use of insulin    Hyperlipidemia, unspecified hyperlipidemia type    1. Her A1C is 8.7 which is up from 8.4 six months ago. She does not routinely test her glucose levels and does not know what she runs non-fasting. She states that in the am her glucose levels are around 125. She is to increase the Levemir to 50 units in the am and keep at 45 units in the pm. She is to continue taking Metformin 1000 mg twice daily.   2. I have asked her to test her glucose levels daily especially at lunch, supper and bedtime and record in " a log. This will allow us to see how effective her medications are.  3. She is to continue all other medications as prescribed.   4. We reviewed her labs. Her Triglycerides have decreased from 735 to 331. Her T. Cholesterol is 207 which is down from 290. She  Is to continue with Fenofibrate 48 mg daily and Omega 3 fish oil.   4. Follow up with me in 3 months.

## 2018-07-03 ENCOUNTER — OFFICE VISIT (OUTPATIENT)
Dept: FAMILY MEDICINE CLINIC | Facility: CLINIC | Age: 68
End: 2018-07-03

## 2018-07-03 VITALS
HEIGHT: 64 IN | WEIGHT: 227.8 LBS | HEART RATE: 60 BPM | OXYGEN SATURATION: 99 % | DIASTOLIC BLOOD PRESSURE: 76 MMHG | BODY MASS INDEX: 38.89 KG/M2 | SYSTOLIC BLOOD PRESSURE: 142 MMHG

## 2018-07-03 DIAGNOSIS — K04.7 TOOTH ABSCESS: ICD-10-CM

## 2018-07-03 DIAGNOSIS — R26.89 BALANCE DISORDER: Primary | ICD-10-CM

## 2018-07-03 PROCEDURE — 99214 OFFICE O/P EST MOD 30 MIN: CPT | Performed by: NURSE PRACTITIONER

## 2018-07-03 RX ORDER — CEPHALEXIN 500 MG/1
500 CAPSULE ORAL 2 TIMES DAILY
Qty: 14 CAPSULE | Refills: 0 | Status: SHIPPED | OUTPATIENT
Start: 2018-07-03 | End: 2019-01-29

## 2018-07-03 NOTE — PROGRESS NOTES
Subjective   Bee Tsang is a 67 y.o. female.     Chief Complaint   Patient presents with   • Dizziness     X's 2 weeks   • Abscess     tooth, can't get into the dentist     Ms. Tsang presents today with complaints of feeling off balance for the past 2 to 3 weeks. She states that when she bends over and the stands up alas she feels off balance, not really dizzy. Sometimes she has to hold onto the wall to regain her balance. 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 She denies chest pain, shortness of breath, hypoglycemia. She does not check her blood pressure when this is happening.   Her other concern today is that she has an abscessed tooth on the right side of her mouth. She has called her dentist and is unable to see him right away. She is diabetic and she does not want the tooth to go untreated until next week.     I have reviewed the patient's medical history in detail and updated the computerized patient record.    The following portions of the patient's history were reviewed and updated as appropriate: allergies, current medications, past family history, past medical history, past social history, past surgical history and problem list.       Current Outpatient Prescriptions:   •  acetaminophen (TYLENOL ARTHRITIS PAIN) 650 MG 8 hr tablet, Take 650 mg by mouth Every 8 (Eight) Hours As Needed for Mild Pain (1-3)., Disp: , Rfl:   •  albuterol (PROVENTIL HFA;VENTOLIN HFA) 108 (90 BASE) MCG/ACT inhaler, Inhale 2 puffs Every 4 (Four) Hours As Needed for wheezing or shortness of air., Disp: 18 g, Rfl: 3  •  aspirin 81 MG EC tablet, Take 81 mg by mouth daily., Disp: , Rfl:   •  B Complex Vitamins (B COMPLEX-B12 PO), Take 1 tablet by mouth., Disp: , Rfl:   •  buPROPion XL (WELLBUTRIN XL) 300 MG 24 hr tablet, TAKE ONE TABLET BY MOUTH ONCE DAILY, Disp: 90 tablet, Rfl: 1  •   Cholecalciferol (VITAMIN D) 1000 units tablet, Take 1,000 Units by mouth., Disp: , Rfl:   •  CloNIDine (CATAPRES) 0.2 MG tablet, TAKE ONE TABLET BY MOUTH TWICE DAILY, Disp: 120 tablet, Rfl: 1  •  EPINEPHrine (EPIPEN) 0.3 MG/0.3ML solution auto-injector injection, Inject  as directed., Disp: , Rfl:   •  fenofibrate (TRICOR) 48 MG tablet, TAKE ONE TABLET BY MOUTH ONCE DAILY, Disp: 30 tablet, Rfl: 5  •  hydrochlorothiazide (HYDRODIURIL) 25 MG tablet, TAKE ONE TABLET BY MOUTH ONCE DAILY, Disp: 90 tablet, Rfl: 1  •  insulin detemir (LEVEMIR) 100 UNIT/ML injection, Inject 45 Units under the skin Daily., Disp: , Rfl:   •  Insulin Pen Needle 32G X 4 MM misc, Use to inject Levemir BID, Disp: 100 each, Rfl: 1  •  Lactobacillus (PROBIOTIC ACIDOPHILUS) capsule, Take 1 capsule by mouth., Disp: , Rfl:   •  meloxicam (MOBIC) 7.5 MG tablet, TAKE ONE TABLET BY MOUTH TWICE DAILY, Disp: 60 tablet, Rfl: 3  •  metFORMIN (GLUCOPHAGE) 1000 MG tablet, TAKE ONE TABLET BY MOUTH TWICE DAILY, Disp: 180 tablet, Rfl: 1  •  metoprolol tartrate (LOPRESSOR) 100 MG tablet, TAKE ONE TABLET BY MOUTH TWICE DAILY, Disp: 180 tablet, Rfl: 1  •  Omega-3 Fatty Acids (FISH OIL) 1000 MG capsule capsule, Take 1 capsule by mouth., Disp: , Rfl:   •  ondansetron (ZOFRAN) 4 MG tablet, Take 4 mg by mouth., Disp: , Rfl:   •  vitamin C (ASCORBIC ACID) 500 MG tablet, Take 500 mg by mouth., Disp: , Rfl:   •  cephalexin (KEFLEX) 500 MG capsule, Take 1 capsule by mouth 2 (Two) Times a Day., Disp: 14 capsule, Rfl: 0    Review of Systems   Constitutional: Negative for chills and fever.   HENT: Negative for ear discharge, ear pain and hearing loss. Mouth sores: abscess tooth on right side.    Respiratory: Negative.    Cardiovascular: Negative.    Neurological: Positive for dizziness and light-headedness. Negative for syncope.       Objective    Vitals:    07/03/18 1355   BP: 142/76   Pulse: 60   SpO2: 99%     Physical Exam   Constitutional: She appears well-developed and  well-nourished.   HENT:   Right Ear: Hearing, tympanic membrane, external ear and ear canal normal.   Left Ear: Hearing, external ear and ear canal normal.   Mouth/Throat: Uvula is midline, oropharynx is clear and moist and mucous membranes are normal. Dental abscesses present.   Cardiovascular: Normal rate, regular rhythm and normal heart sounds.    Vitals reviewed.      Assessment/Plan   Bee was seen today for dizziness and abscess.    Diagnoses and all orders for this visit:    Balance disorder  -     Ambulatory Referral to ENT (Otolaryngology)    Tooth abscess  -     cephalexin (KEFLEX) 500 MG capsule; Take 1 capsule by mouth 2 (Two) Times a Day.       1. Orthostatic blood pressure as follows: lying 150/76, sitting 154/80 and standing 148/78. Her heart rate was between 60 and 63. So she is negative for orthostatic hypotension.   2. I am referring her to ENT, Dr. Alvarez, who did the surgery to remove the tumor in her left ear, for further evaluation of her balance disorder.  3. I have ordered Keflex 500 mg twice a day x 7 day for the tooth abscess. She states she has taken Keflex in the past without any difficulty despite her allergy to PCN and amoxicillin.   4. Follow up at her next scheduled appointment.

## 2018-08-15 DIAGNOSIS — E78.5 DYSLIPIDEMIA: ICD-10-CM

## 2018-08-15 RX ORDER — FENOFIBRATE 48 MG/1
TABLET, COATED ORAL
Qty: 30 TABLET | Refills: 5 | Status: SHIPPED | OUTPATIENT
Start: 2018-08-15 | End: 2019-02-10 | Stop reason: SDUPTHER

## 2018-08-15 RX ORDER — HYDROCHLOROTHIAZIDE 25 MG/1
TABLET ORAL
Qty: 30 TABLET | Refills: 5 | Status: SHIPPED | OUTPATIENT
Start: 2018-08-15 | End: 2018-11-27 | Stop reason: SDUPTHER

## 2018-09-07 RX ORDER — BUPROPION HYDROCHLORIDE 300 MG/1
TABLET ORAL
Qty: 30 TABLET | Refills: 5 | Status: SHIPPED | OUTPATIENT
Start: 2018-09-07 | End: 2019-03-17 | Stop reason: SDUPTHER

## 2018-09-14 RX ORDER — KRILL/OM-3/DHA/EPA/PHOSPHO/AST 1000-230MG
1 CAPSULE ORAL DAILY
Qty: 30 CAPSULE | Refills: 3 | Status: SHIPPED | OUTPATIENT
Start: 2018-09-14 | End: 2018-09-18 | Stop reason: SDUPTHER

## 2018-09-18 ENCOUNTER — OFFICE VISIT (OUTPATIENT)
Dept: FAMILY MEDICINE CLINIC | Facility: CLINIC | Age: 68
End: 2018-09-18

## 2018-09-18 VITALS
WEIGHT: 219 LBS | HEIGHT: 64 IN | HEART RATE: 53 BPM | OXYGEN SATURATION: 94 % | DIASTOLIC BLOOD PRESSURE: 68 MMHG | SYSTOLIC BLOOD PRESSURE: 118 MMHG | BODY MASS INDEX: 37.39 KG/M2

## 2018-09-18 DIAGNOSIS — I10 ESSENTIAL HYPERTENSION: Primary | ICD-10-CM

## 2018-09-18 DIAGNOSIS — I10 ESSENTIAL HYPERTENSION: ICD-10-CM

## 2018-09-18 DIAGNOSIS — Z01.818 PRE-OPERATIVE CLEARANCE: ICD-10-CM

## 2018-09-18 PROCEDURE — 99213 OFFICE O/P EST LOW 20 MIN: CPT | Performed by: NURSE PRACTITIONER

## 2018-09-18 PROCEDURE — G0008 ADMIN INFLUENZA VIRUS VAC: HCPCS | Performed by: NURSE PRACTITIONER

## 2018-09-18 PROCEDURE — 90662 IIV NO PRSV INCREASED AG IM: CPT | Performed by: NURSE PRACTITIONER

## 2018-09-18 RX ORDER — CLONIDINE HYDROCHLORIDE 0.2 MG/1
TABLET ORAL
Qty: 120 TABLET | Refills: 2 | Status: SHIPPED | OUTPATIENT
Start: 2018-09-18 | End: 2019-03-12 | Stop reason: SDUPTHER

## 2018-09-18 RX ORDER — MAGNESIUM GLUCONATE 27 MG(500)
27 TABLET ORAL 2 TIMES DAILY
COMMUNITY

## 2018-09-18 NOTE — PROGRESS NOTES
Subjective   Bee Tsang is a 67 y.o. female.     Chief Complaint   Patient presents with   • Hypertension     Surgery Clearance  Related to Blood Pressure     Ms. Tsang presents today to pre-operative clearance to have cataract surgery. She state that when she was in office of [facility]  to set up cataract surgery her blood pressure was elevated. She realized after leaving the office that she did not take her blood pressure medications that day. She is scheduled to have surgery on her left eye on 9/26/18 and her right eye on 10/5/18. She denies chest pain, shortness of breath, palpitations, edema or headaches. She is currently taking Clonidine 0.2 mg twice daily, HCTZ 25 mg daily, Metoprolol 100 mg twice daily for control of her hypertension.     I have reviewed the patient's medical history in detail and updated the computerized patient record.    The following portions of the patient's history were reviewed and updated as appropriate: allergies, current medications, past family history, past medical history, past social history, past surgical history and problem list.       Current Outpatient Prescriptions:   •  albuterol (PROVENTIL HFA;VENTOLIN HFA) 108 (90 BASE) MCG/ACT inhaler, Inhale 2 puffs Every 4 (Four) Hours As Needed for wheezing or shortness of air., Disp: 18 g, Rfl: 3  •  aspirin 81 MG EC tablet, Take 81 mg by mouth daily., Disp: , Rfl:   •  B Complex Vitamins (B COMPLEX-B12 PO), Take 1 tablet by mouth., Disp: , Rfl:   •  buPROPion XL (WELLBUTRIN XL) 300 MG 24 hr tablet, TAKE ONE TABLET BY MOUTH EVERY DAY, Disp: 30 tablet, Rfl: 5  •  Cholecalciferol (VITAMIN D) 1000 units tablet, Take 1,000 Units by mouth., Disp: , Rfl:   •  CloNIDine (CATAPRES) 0.2 MG tablet, TAKE ONE TABLET BY MOUTH TWICE DAILY, Disp: 120 tablet, Rfl: 1  •  EPINEPHrine (EPIPEN) 0.3 MG/0.3ML solution auto-injector injection, Inject  as directed., Disp: , Rfl:   •  fenofibrate (TRICOR) 48 MG tablet, TAKE ONE TABLET BY  "MOUTH EVERY DAY, Disp: 30 tablet, Rfl: 5  •  hydrochlorothiazide (HYDRODIURIL) 25 MG tablet, TAKE ONE TABLET BY MOUTH EVERY DAY, Disp: 30 tablet, Rfl: 5  •  insulin detemir (LEVEMIR) 100 UNIT/ML injection, Inject 45 Units under the skin Daily., Disp: , Rfl:   •  Lactobacillus (PROBIOTIC ACIDOPHILUS) capsule, Take 1 capsule by mouth., Disp: , Rfl:   •  magnesium gluconate (MAGONATE) 500 MG tablet, Take 27 mg by mouth 2 (Two) Times a Day., Disp: , Rfl:   •  metFORMIN (GLUCOPHAGE) 1000 MG tablet, TAKE ONE TABLET BY MOUTH TWICE DAILY, Disp: 180 tablet, Rfl: 1  •  metoprolol tartrate (LOPRESSOR) 100 MG tablet, TAKE ONE TABLET BY MOUTH TWICE DAILY, Disp: 180 tablet, Rfl: 1  •  Omega-3 Fatty Acids (FISH OIL) 1000 MG capsule capsule, Take 1 capsule by mouth., Disp: , Rfl:   •  vitamin C (ASCORBIC ACID) 500 MG tablet, Take 500 mg by mouth., Disp: , Rfl:   •  acetaminophen (TYLENOL ARTHRITIS PAIN) 650 MG 8 hr tablet, Take 650 mg by mouth Every 8 (Eight) Hours As Needed for Mild Pain (1-3)., Disp: , Rfl:   •  cephalexin (KEFLEX) 500 MG capsule, Take 1 capsule by mouth 2 (Two) Times a Day., Disp: 14 capsule, Rfl: 0  •  Insulin Pen Needle 32G X 4 MM misc, Use to inject Levemir BID, Disp: 100 each, Rfl: 1  •  meloxicam (MOBIC) 7.5 MG tablet, TAKE ONE TABLET BY MOUTH TWICE DAILY, Disp: 60 tablet, Rfl: 3  •  ondansetron (ZOFRAN) 4 MG tablet, Take 4 mg by mouth., Disp: , Rfl:     Review of Systems   Constitutional: Negative.    Eyes: Positive for visual disturbance (has cataracts).   Respiratory: Negative.    Cardiovascular: Negative.    Neurological: Negative.        Objective    Vitals:    09/18/18 0851 09/18/18 0903   BP: 138/82 118/68   Pulse: 53    SpO2: 94%    Weight: 99.3 kg (219 lb)    Height: 161.3 cm (63.5\")      Physical Exam   Constitutional: She is oriented to person, place, and time. She appears well-developed and well-nourished.   Eyes: Pupils are equal, round, and reactive to light. Conjunctivae and EOM are normal. "   Cardiovascular: Normal rate, regular rhythm, normal heart sounds and intact distal pulses.    Pulmonary/Chest: Effort normal and breath sounds normal.   Neurological: She is alert and oriented to person, place, and time.   Skin: Skin is warm and dry.   Psychiatric:   No acute distress   Vitals reviewed.      Assessment/Plan   Bee was seen today for hypertension.    Diagnoses and all orders for this visit:    Essential hypertension    Pre-operative clearance    Other orders  -     Flu Vaccine High Dose PF 65YR+ ([id number])       1. Ms Pradhan's blood pressure was 138/88 and 118/68 today in the office. I feel that her blood pressure is in control and she can have cataract surgery.   2. She has also received a high dose flu vaccine today.   3. She is to follow up in November for an annual medicare wellness exam.

## 2018-10-18 ENCOUNTER — OFFICE VISIT (OUTPATIENT)
Dept: FAMILY MEDICINE CLINIC | Facility: CLINIC | Age: 68
End: 2018-10-18

## 2018-10-18 VITALS
WEIGHT: 223 LBS | HEART RATE: 72 BPM | BODY MASS INDEX: 38.07 KG/M2 | DIASTOLIC BLOOD PRESSURE: 76 MMHG | OXYGEN SATURATION: 98 % | SYSTOLIC BLOOD PRESSURE: 128 MMHG | HEIGHT: 64 IN

## 2018-10-18 DIAGNOSIS — F41.9 ANXIETY: Primary | ICD-10-CM

## 2018-10-18 DIAGNOSIS — E78.5 HYPERLIPIDEMIA, UNSPECIFIED HYPERLIPIDEMIA TYPE: ICD-10-CM

## 2018-10-18 DIAGNOSIS — E11.65 UNCONTROLLED TYPE 2 DIABETES MELLITUS WITH HYPERGLYCEMIA (HCC): ICD-10-CM

## 2018-10-18 LAB — HBA1C MFR BLD: 8.1 %

## 2018-10-18 PROCEDURE — 99214 OFFICE O/P EST MOD 30 MIN: CPT | Performed by: NURSE PRACTITIONER

## 2018-10-18 PROCEDURE — 83036 HEMOGLOBIN GLYCOSYLATED A1C: CPT | Performed by: NURSE PRACTITIONER

## 2018-10-18 RX ORDER — HYDROXYZINE PAMOATE 25 MG/1
25 CAPSULE ORAL 3 TIMES DAILY PRN
Qty: 90 CAPSULE | Refills: 5 | Status: SHIPPED | OUTPATIENT
Start: 2018-10-18

## 2018-10-18 RX ORDER — PREDNISOLONE ACETATE 10 MG/ML
SUSPENSION/ DROPS OPHTHALMIC
Refills: 3 | COMMUNITY
Start: 2018-09-12 | End: 2019-07-24

## 2018-10-18 RX ORDER — POLYMYXIN B SULFATE AND TRIMETHOPRIM 1; 10000 MG/ML; [USP'U]/ML
SOLUTION OPHTHALMIC
Refills: 1 | COMMUNITY
Start: 2018-09-12 | End: 2019-07-24

## 2018-10-18 RX ORDER — NEPAFENAC 0.3 %
SUSPENSION, DROPS(FINAL DOSAGE FORM)(ML) OPHTHALMIC (EYE)
Refills: 1 | COMMUNITY
Start: 2018-09-12 | End: 2019-07-24

## 2018-10-18 NOTE — PROGRESS NOTES
Subjective   Bee Tsang is a 67 y.o. female.     Chief Complaint   Patient presents with   • Diabetes   • Hypertension     Ms. Tsang presents today to follow up on her diabetes control and her hypertension. She states that her blood pressure has been elevated at home with her systolic pressure running between 160-170 and her diastolic in the 80s. She is currently taking Clonidine 0.2 mg, Metoprolol 100 mg  and HCTZ 25 mg  to control her pressure and has been well controled on this medications. She has had allergic reactions to several antihypertensive medication and is limited to what she can take to control her blood pressure. She thinks that her anxiety is what is causing the rise in her blood pressure. 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   Her only concern about her diabetes today is that she is in the doughnut hole and cannot afford her Levemir. She states that she 11 Tresiba pens that were her mother's before she passed away a few months ago and was wondering if she could use the Tresiba until after the first of the year.    I have reviewed the patient's medical history in detail and updated the computerized patient record.    The following portions of the patient's history were reviewed and updated as appropriate: allergies, current medications, past family history, past medical history, past social history, past surgical history and problem list.     Current Outpatient Prescriptions on File Prior to Visit   Medication Sig   • acetaminophen (TYLENOL ARTHRITIS PAIN) 650 MG 8 hr tablet Take 650 mg by mouth Every 8 (Eight) Hours As Needed for Mild Pain (1-3).   • albuterol (PROVENTIL HFA;VENTOLIN HFA) 108 (90 BASE) MCG/ACT inhaler Inhale 2 puffs Every 4 (Four) Hours As Needed for wheezing or shortness of air.   • aspirin 81 MG EC tablet Take 81 mg by mouth daily.   • B Complex Vitamins (B COMPLEX-B12 PO) Take 1 tablet by mouth.    • buPROPion XL (WELLBUTRIN XL) 300 MG 24 hr tablet TAKE ONE TABLET BY MOUTH EVERY DAY   • cephalexin (KEFLEX) 500 MG capsule Take 1 capsule by mouth 2 (Two) Times a Day.   • Cholecalciferol (VITAMIN D) 1000 units tablet Take 1,000 Units by mouth.   • CloNIDine (CATAPRES) 0.2 MG tablet TAKE ONE TABLET BY MOUTH TWICE DAILY   • EPINEPHrine (EPIPEN) 0.3 MG/0.3ML solution auto-injector injection Inject  as directed.   • fenofibrate (TRICOR) 48 MG tablet TAKE ONE TABLET BY MOUTH EVERY DAY   • hydrochlorothiazide (HYDRODIURIL) 25 MG tablet TAKE ONE TABLET BY MOUTH EVERY DAY   • Insulin Pen Needle 32G X 4 MM misc Use to inject Levemir BID   • Lactobacillus (PROBIOTIC ACIDOPHILUS) capsule Take 1 capsule by mouth.   • magnesium gluconate (MAGONATE) 500 MG tablet Take 27 mg by mouth 2 (Two) Times a Day.   • meloxicam (MOBIC) 7.5 MG tablet TAKE ONE TABLET BY MOUTH TWICE DAILY   • metFORMIN (GLUCOPHAGE) 1000 MG tablet TAKE ONE TABLET BY MOUTH TWICE DAILY   • metoprolol tartrate (LOPRESSOR) 100 MG tablet TAKE ONE TABLET BY MOUTH TWICE DAILY   • Omega-3 Fatty Acids (FISH OIL) 1000 MG capsule capsule Take 1 capsule by mouth.   • ondansetron (ZOFRAN) 4 MG tablet Take 4 mg by mouth.   • vitamin C (ASCORBIC ACID) 500 MG tablet Take 500 mg by mouth.   • [DISCONTINUED] insulin detemir (LEVEMIR) 100 UNIT/ML injection Inject 45 Units under the skin Daily.     No current facility-administered medications on file prior to visit.        Review of Systems   Constitutional: Negative.    Respiratory: Negative.    Cardiovascular: Negative.    Endocrine: Negative.    Neurological: Negative.    Psychiatric/Behavioral: The patient is nervous/anxious.        Objective    Vitals:    10/18/18 0822   BP: 128/76   Pulse: 72   SpO2: 98%     Physical Exam   Constitutional: She is oriented to person, place, and time. She appears well-developed and well-nourished.   Cardiovascular: Normal rate, regular rhythm, normal heart sounds and intact distal pulses.     Pulmonary/Chest: Effort normal and breath sounds normal.   Neurological: She is alert and oriented to person, place, and time.   Skin: Skin is warm and dry.   Psychiatric:   No acute distress   Vitals reviewed.      Assessment/Plan   Bee was seen today for diabetes and hypertension.    Diagnoses and all orders for this visit:    Anxiety  -     hydrOXYzine (VISTARIL) 25 MG capsule; Take 1 capsule by mouth 3 (Three) Times a Day As Needed for Itching.    Hyperlipidemia, unspecified hyperlipidemia type  -     Lipid Panel With / Chol / HDL Ratio; Future    Uncontrolled type 2 diabetes mellitus with hyperglycemia (CMS/[facility])  -     Basic Metabolic Panel; Future  -     Hemoglobin A1c; Future  -     POC Glycosylated Hemoglobin (Hb A1C)    1. Her blood pressure today is 128/68. She is to continue with Clonidine 0.2 mg daily as prescribed, Metoprolol 100 mg twice  and HCTZ 25 mg daily.  2. Her A1C is 8.1 today which is down for 8.9 six months ago. She is to start Tresiba 45 units daily and I will titrate the dose as needed. She is to continue the Metformin 1000 mg twice daily.  3. She does not want to take an anti-anxiety medication daily at this time. I will start her on Hydroxyzine 25 mg three times a day as needed for anxiety.   4. She is to follow up in 3 months with fasting labs the week before.

## 2018-11-12 ENCOUNTER — OFFICE VISIT (OUTPATIENT)
Dept: FAMILY MEDICINE CLINIC | Facility: CLINIC | Age: 68
End: 2018-11-12

## 2018-11-12 VITALS
WEIGHT: 222 LBS | OXYGEN SATURATION: 98 % | HEIGHT: 64 IN | TEMPERATURE: 97.8 F | HEART RATE: 52 BPM | RESPIRATION RATE: 14 BRPM | SYSTOLIC BLOOD PRESSURE: 122 MMHG | BODY MASS INDEX: 37.9 KG/M2 | DIASTOLIC BLOOD PRESSURE: 78 MMHG

## 2018-11-12 DIAGNOSIS — Z00.00 MEDICARE ANNUAL WELLNESS VISIT, SUBSEQUENT: ICD-10-CM

## 2018-11-12 DIAGNOSIS — Z12.39 SCREENING FOR BREAST CANCER: Primary | ICD-10-CM

## 2018-11-12 DIAGNOSIS — Z23 NEED FOR 23-POLYVALENT PNEUMOCOCCAL POLYSACCHARIDE VACCINE: ICD-10-CM

## 2018-11-12 PROCEDURE — G0439 PPPS, SUBSEQ VISIT: HCPCS | Performed by: NURSE PRACTITIONER

## 2018-11-12 RX ORDER — AMOXICILLIN 250 MG
2 CAPSULE ORAL
COMMUNITY
Start: 2017-11-14

## 2018-11-12 RX ORDER — ASPIRIN 81 MG/1
81 TABLET ORAL
COMMUNITY
Start: 2017-11-14

## 2018-11-12 RX ORDER — HYDROCODONE BITARTRATE AND ACETAMINOPHEN 7.5; 325 MG/1; MG/1
1-2 TABLET ORAL
COMMUNITY
Start: 2017-11-08 | End: 2019-07-24

## 2018-11-12 NOTE — PROGRESS NOTES
QUICK REFERENCE INFORMATION:  The ABCs of the Annual Wellness Visit    Subsequent Medicare Wellness Visit    HEALTH RISK ASSESSMENT    1950    Recent Hospitalizations:  No hospitalization(s) within the last year..        Current Medical Providers:  Patient Care Team:  Elizabeth Madrigal APRN as PCP - General (Family Medicine)        Smoking Status:  Social History     Tobacco Use   Smoking Status Never Smoker   Smokeless Tobacco Never Used       Alcohol Consumption:  Social History     Substance and Sexual Activity   Alcohol Use No       Depression Screen:   PHQ-2/PHQ-9 Depression Screening 11/12/2018   Little interest or pleasure in doing things 0   Feeling down, depressed, or hopeless 0   Total Score 0       Health Habits and Functional and Cognitive Screening:  Functional & Cognitive Status 11/12/2018   Do you have difficulty preparing food and eating? No   Do you have difficulty bathing yourself, getting dressed or grooming yourself? No   Do you have difficulty using the toilet? No   Do you have difficulty moving around from place to place? No   Do you have trouble with steps or getting out of a bed or a chair? No   In the past year have you fallen or experienced a near fall? No   Current Diet Low Carb Diet   Dental Exam Not up to date   Eye Exam Up to date   Exercise (times per week) 3 times per week   Current Exercise Activities Include Aerobics   Do you need help using the phone?  No   Are you deaf or do you have serious difficulty hearing?  Yes   Do you need help with transportation? No   Do you need help shopping? No   Do you need help preparing meals?  No   Do you need help with housework?  No   Do you need help with laundry? No   Do you need help taking your medications? No   Do you need help managing money? No   Do you ever drive or ride in a car without wearing a seat belt? No   Have you felt unusual stress, anger or loneliness in the last month? Yes   Who do you live with? Community   If you need  help, do you have trouble finding someone available to you? No   Have you been bothered in the last four weeks by sexual problems? No   Do you have difficulty concentrating, remembering or making decisions? No           Does the patient have evidence of cognitive impairment? No    Aspirin use counseling: Taking ASA appropriately as indicated      Recent Lab Results:  CMP:  Lab Results   Component Value Date     (H) 05/29/2018    BUN 21 05/29/2018    CREATININE 0.80 05/29/2018    EGFRIFNONA 72 05/29/2018    EGFRIFAFRI 87 05/29/2018    BCR 26.3 (H) 05/29/2018     05/29/2018    K 4.4 05/29/2018    CO2 27.4 05/29/2018    CALCIUM 10.2 05/29/2018    PROTENTOTREF 7.0 05/29/2018    ALBUMIN 4.20 05/29/2018    LABGLOBREF 2.8 05/29/2018    LABIL2 1.5 05/29/2018    BILITOT 0.3 05/29/2018    ALKPHOS 58 05/29/2018    AST 21 05/29/2018    ALT 29 05/29/2018     Lipid Panel:  Lab Results   Component Value Date    TRIG 331 (H) 05/29/2018    HDL 40 05/29/2018    VLDL 66.2 (H) 05/29/2018     HbA1c:  Lab Results   Component Value Date    HGBA1C 8.1 10/18/2018       Visual Acuity:  No exam data present    Age-appropriate Screening Schedule:  Refer to the list below for future screening recommendations based on patient's age, sex and/or medical conditions. Orders for these recommended tests are listed in the plan section. The patient has been provided with a written plan.    Health Maintenance   Topic Date Due   • TDAP/TD VACCINES (1 - Tdap) 11/18/1970   • ZOSTER VACCINE (1 of 2) 11/18/2001   • MAMMOGRAM  10/25/2016   • COLONOSCOPY  10/25/2016   • DIABETIC FOOT EXAM  10/25/2017   • PNEUMOCOCCAL VACCINES (65+ LOW/MEDIUM RISK) (2 of 2 - PPSV23) 10/17/2018   • HEMOGLOBIN A1C  04/18/2019   • LIPID PANEL  05/29/2019   • URINE MICROALBUMIN  06/01/2019   • DIABETIC EYE EXAM  09/05/2019   • INFLUENZA VACCINE  Completed        Subjective   History of Present Illness    Bee Tsang is a 67 y.o. female who presents for an  Subsequent Wellness Visit.    The following portions of the patient's history were reviewed and updated as appropriate: allergies, current medications, past family history, past medical history, past social history, past surgical history and problem list.    Outpatient Medications Prior to Visit   Medication Sig Dispense Refill   • acetaminophen (TYLENOL ARTHRITIS PAIN) 650 MG 8 hr tablet Take 650 mg by mouth Every 8 (Eight) Hours As Needed for Mild Pain (1-3).     • albuterol (PROVENTIL HFA;VENTOLIN HFA) 108 (90 BASE) MCG/ACT inhaler Inhale 2 puffs Every 4 (Four) Hours As Needed for wheezing or shortness of air. 18 g 3   • aspirin 81 MG EC tablet Take 81 mg by mouth.     • B Complex Vitamins (B COMPLEX-B12 PO) Take 1 tablet by mouth.     • buPROPion XL (WELLBUTRIN XL) 300 MG 24 hr tablet TAKE ONE TABLET BY MOUTH EVERY DAY 30 tablet 5   • Cholecalciferol (VITAMIN D) 1000 units tablet Take 1,000 Units by mouth.     • EPINEPHrine (EPIPEN) 0.3 MG/0.3ML solution auto-injector injection Inject  as directed.     • fenofibrate (TRICOR) 48 MG tablet TAKE ONE TABLET BY MOUTH EVERY DAY 30 tablet 5   • hydrochlorothiazide (HYDRODIURIL) 25 MG tablet TAKE ONE TABLET BY MOUTH EVERY DAY 30 tablet 5   • hydrOXYzine (VISTARIL) 25 MG capsule Take 1 capsule by mouth 3 (Three) Times a Day As Needed for Itching. 90 capsule 5   • Insulin Degludec 200 UNIT/ML solution pen-injector Inject 45 unit marking on U-100 syringe under the skin into the appropriate area as directed Daily.     • insulin detemir (LEVEMIR) 100 UNIT/ML injection Inject 45 Units under the skin into the appropriate area as directed.     • Insulin Pen Needle 32G X 4 MM misc Use to inject Levemir  each 1   • Lactobacillus (PROBIOTIC ACIDOPHILUS) capsule Take 1 capsule by mouth.     • magnesium gluconate (MAGONATE) 500 MG tablet Take 27 mg by mouth 2 (Two) Times a Day.     • metFORMIN (GLUCOPHAGE) 1000 MG tablet TAKE ONE TABLET BY MOUTH TWICE DAILY 180 tablet 1   •  metoprolol tartrate (LOPRESSOR) 100 MG tablet TAKE ONE TABLET BY MOUTH TWICE DAILY 180 tablet 1   • Omega-3 Fatty Acids (FISH OIL) 1000 MG capsule capsule Take 1 capsule by mouth.     • trimethoprim-polymyxin b (POLYTRIM) 99253-6.1 UNIT/ML-% ophthalmic solution INSTILL ONE drop FOUR TIMES DAILY IN THE LEFT EYE STARTING THREE DAYS prior TO surgery, USE DAY OF surgery AND continue FOR THREE DAYS follo  1   • vitamin C (ASCORBIC ACID) 500 MG tablet Take 500 mg by mouth.     • cephalexin (KEFLEX) 500 MG capsule Take 1 capsule by mouth 2 (Two) Times a Day. 14 capsule 0   • CloNIDine (CATAPRES) 0.2 MG tablet TAKE ONE TABLET BY MOUTH TWICE DAILY 120 tablet 2   • HYDROcodone-acetaminophen (NORCO) 7.5-325 MG per tablet Take 1-2 tablets by mouth.     • ILEVRO 0.3 % suspension INSTILL ONE Drop IN THE LEFT EYE EVERY DAY FOR TWO WEEKS THEN STOP  1   • meloxicam (MOBIC) 7.5 MG tablet TAKE ONE TABLET BY MOUTH TWICE DAILY 60 tablet 3   • ondansetron (ZOFRAN) 4 MG tablet Take 4 mg by mouth.     • prednisoLONE acetate (PRED FORTE) 1 % ophthalmic suspension INSTILL ONE Drop IN THE LEFT EYE FOUR TIMES DAILY FOR ONE WEEK, THEN THREE TIMES DAILY FOR ONE WEEK, THEN TWICE DAILY FOR ONE WEEK THEN EVER  3   • senna-docusate (PERICOLACE) 8.6-50 MG per tablet Take 2 tablets by mouth.     • aspirin 81 MG EC tablet Take 81 mg by mouth daily.       No facility-administered medications prior to visit.        Patient Active Problem List   Diagnosis   • Abnormal electrocardiogram   • Asthma   • Depression   • Uncontrolled type 2 diabetes mellitus (CMS/HCC)   • Dyslipidemia   • Fatigue   • Hearing loss   • Essential hypertension   • Morbid obesity (CMS/HCC)   • Multiple joint pain   • Polyphagia(783.6)   • Snoring   • Dietary counseling   • Unintended weight gain   • Constipation   • Regurgitation   • Hyperlipidemia       Advance Care Planning:  has NO advance directive - information provided to the patient today    Identification of Risk  "Factors:  Risk factors include: weight , cardiovascular risk, increased fall risk and caretaker stress.    Review of Systems   Constitutional: Negative.    Respiratory: Negative.    Cardiovascular: Negative.    Musculoskeletal: Positive for arthralgias.        Off balance at times. Has never fallen   Neurological: Negative.    All other systems reviewed and are negative.      Compared to one year ago, the patient feels her physical health is better.  Compared to one year ago, the patient feels her mental health is the same.    Objective     Physical Exam   Constitutional: She is oriented to person, place, and time. She appears well-developed and well-nourished.   Neurological: She is alert and oriented to person, place, and time.   Skin: Skin is warm and dry.   Psychiatric:   No acute distress   Vitals reviewed.      Vitals:    11/12/18 0806   BP: 122/78   BP Location: Left arm   Patient Position: Sitting   Cuff Size: Large Adult   Pulse: 52   Resp: 14   Temp: 97.8 °F (36.6 °C)   TempSrc: Oral   SpO2: 98%   Weight: 101 kg (222 lb)   Height: 161.3 cm (63.5\")       Patient's Body mass index is 38.71 kg/m². BMI is above normal parameters. Recommendations include: exercise counseling and nutrition counseling.      Assessment/Plan   Patient Self-Management and Personalized Health Advice  The patient has been provided with information about: diet, exercise, weight management, prevention of cardiac or vascular disease, fall prevention and designing advance directives and preventive services including:   · Advance directive, Exercise counseling provided.    Visit Diagnoses:  No diagnosis found.    No orders of the defined types were placed in this encounter.      Outpatient Encounter Medications as of 11/12/2018   Medication Sig Dispense Refill   • acetaminophen (TYLENOL ARTHRITIS PAIN) 650 MG 8 hr tablet Take 650 mg by mouth Every 8 (Eight) Hours As Needed for Mild Pain (1-3).     • albuterol (PROVENTIL HFA;VENTOLIN HFA) " 108 (90 BASE) MCG/ACT inhaler Inhale 2 puffs Every 4 (Four) Hours As Needed for wheezing or shortness of air. 18 g 3   • aspirin 81 MG EC tablet Take 81 mg by mouth.     • B Complex Vitamins (B COMPLEX-B12 PO) Take 1 tablet by mouth.     • buPROPion XL (WELLBUTRIN XL) 300 MG 24 hr tablet TAKE ONE TABLET BY MOUTH EVERY DAY 30 tablet 5   • Cholecalciferol (VITAMIN D) 1000 units tablet Take 1,000 Units by mouth.     • EPINEPHrine (EPIPEN) 0.3 MG/0.3ML solution auto-injector injection Inject  as directed.     • fenofibrate (TRICOR) 48 MG tablet TAKE ONE TABLET BY MOUTH EVERY DAY 30 tablet 5   • hydrochlorothiazide (HYDRODIURIL) 25 MG tablet TAKE ONE TABLET BY MOUTH EVERY DAY 30 tablet 5   • hydrOXYzine (VISTARIL) 25 MG capsule Take 1 capsule by mouth 3 (Three) Times a Day As Needed for Itching. 90 capsule 5   • Insulin Degludec 200 UNIT/ML solution pen-injector Inject 45 unit marking on U-100 syringe under the skin into the appropriate area as directed Daily.     • insulin detemir (LEVEMIR) 100 UNIT/ML injection Inject 45 Units under the skin into the appropriate area as directed.     • Insulin Pen Needle 32G X 4 MM misc Use to inject Levemir  each 1   • Lactobacillus (PROBIOTIC ACIDOPHILUS) capsule Take 1 capsule by mouth.     • magnesium gluconate (MAGONATE) 500 MG tablet Take 27 mg by mouth 2 (Two) Times a Day.     • metFORMIN (GLUCOPHAGE) 1000 MG tablet TAKE ONE TABLET BY MOUTH TWICE DAILY 180 tablet 1   • metoprolol tartrate (LOPRESSOR) 100 MG tablet TAKE ONE TABLET BY MOUTH TWICE DAILY 180 tablet 1   • Omega-3 Fatty Acids (FISH OIL) 1000 MG capsule capsule Take 1 capsule by mouth.     • trimethoprim-polymyxin b (POLYTRIM) 58587-5.1 UNIT/ML-% ophthalmic solution INSTILL ONE drop FOUR TIMES DAILY IN THE LEFT EYE STARTING THREE DAYS prior TO surgery, USE DAY OF surgery AND continue FOR THREE DAYS follo  1   • vitamin C (ASCORBIC ACID) 500 MG tablet Take 500 mg by mouth.     • cephalexin (KEFLEX) 500 MG capsule  Take 1 capsule by mouth 2 (Two) Times a Day. 14 capsule 0   • CloNIDine (CATAPRES) 0.2 MG tablet TAKE ONE TABLET BY MOUTH TWICE DAILY 120 tablet 2   • HYDROcodone-acetaminophen (NORCO) 7.5-325 MG per tablet Take 1-2 tablets by mouth.     • ILEVRO 0.3 % suspension INSTILL ONE Drop IN THE LEFT EYE EVERY DAY FOR TWO WEEKS THEN STOP  1   • meloxicam (MOBIC) 7.5 MG tablet TAKE ONE TABLET BY MOUTH TWICE DAILY 60 tablet 3   • ondansetron (ZOFRAN) 4 MG tablet Take 4 mg by mouth.     • prednisoLONE acetate (PRED FORTE) 1 % ophthalmic suspension INSTILL ONE Drop IN THE LEFT EYE FOUR TIMES DAILY FOR ONE WEEK, THEN THREE TIMES DAILY FOR ONE WEEK, THEN TWICE DAILY FOR ONE WEEK THEN EVER  3   • senna-docusate (PERICOLACE) 8.6-50 MG per tablet Take 2 tablets by mouth.     • [DISCONTINUED] aspirin 81 MG EC tablet Take 81 mg by mouth daily.       No facility-administered encounter medications on file as of 11/12/2018.        Reviewed use of high risk medication in the elderly: yes  Reviewed for potential of harmful drug interactions in the elderly: yes    Follow Up:  No Follow-up on file.     An After Visit Summary and PPPS with all of these plans were given to the patient.

## 2018-11-12 NOTE — PATIENT INSTRUCTIONS
Medicare Wellness  Personal Prevention Plan of Service     Date of Office Visit:  2018  Encounter Provider:  RICHELLE Gibbs  Place of Service:  [facility]  Patient Name: Bee Tsang  :  1950    As part of the Medicare Wellness portion of your visit today, we are providing you with this personalized preventive plan of services (PPPS). This plan is based upon recommendations of the [place] Preventive Services Task Force (USPSTF) and the Advisory Committee on Immunization Practices (ACIP).    This lists the preventive care services that should be considered, and provides dates of when you are due. Items listed as completed are up-to-date and do not require any further intervention.    Health Maintenance   Topic Date Due   • TDAP/TD VACCINES (1 - Tdap) 1970   • ZOSTER VACCINE (1 of 2) 2001   • MEDICARE ANNUAL WELLNESS  10/25/2016   • MAMMOGRAM  10/25/2016   • DIABETIC FOOT EXAM  10/25/2017   • PNEUMOCOCCAL VACCINES (65+ LOW/MEDIUM RISK) (2 of 2 - PPSV23) 10/17/2018   • HEMOGLOBIN A1C  2019   • LIPID PANEL  2019   • URINE MICROALBUMIN  2019   • DIABETIC EYE EXAM  2019   • COLONOSCOPY  10/12/2026   • HEPATITIS C SCREENING  Completed   • INFLUENZA VACCINE  Completed       Orders Placed This Encounter   Procedures   • Mammo Screening Bilateral With CAD     Order Specific Question:   Reason for Exam:     Answer:   screening for breast cancer       No Follow-up on file.

## 2018-11-27 RX ORDER — HYDROCHLOROTHIAZIDE 25 MG/1
TABLET ORAL
Qty: 30 TABLET | Refills: 5 | Status: SHIPPED | OUTPATIENT
Start: 2018-11-27 | End: 2019-06-11 | Stop reason: DRUGHIGH

## 2018-12-07 RX ORDER — METOPROLOL TARTRATE 100 MG/1
TABLET ORAL
Qty: 180 TABLET | Refills: 1 | Status: SHIPPED | OUTPATIENT
Start: 2018-12-07 | End: 2019-05-29 | Stop reason: SDUPTHER

## 2019-01-20 ENCOUNTER — RESULTS ENCOUNTER (OUTPATIENT)
Dept: FAMILY MEDICINE CLINIC | Facility: CLINIC | Age: 69
End: 2019-01-20

## 2019-01-20 DIAGNOSIS — E11.65 UNCONTROLLED TYPE 2 DIABETES MELLITUS WITH HYPERGLYCEMIA (HCC): ICD-10-CM

## 2019-01-20 DIAGNOSIS — E78.5 HYPERLIPIDEMIA, UNSPECIFIED HYPERLIPIDEMIA TYPE: ICD-10-CM

## 2019-01-23 LAB
BUN SERPL-MCNC: 17 MG/DL (ref 8–23)
BUN/CREAT SERPL: 23 (ref 7–25)
CALCIUM SERPL-MCNC: 11 MG/DL (ref 8.6–10.5)
CHLORIDE SERPL-SCNC: 98 MMOL/L (ref 98–107)
CHOLEST SERPL-MCNC: 194 MG/DL (ref 0–200)
CHOLEST/HDLC SERPL: 4.85 {RATIO}
CO2 SERPL-SCNC: 27.2 MMOL/L (ref 22–29)
CREAT SERPL-MCNC: 0.74 MG/DL (ref 0.57–1)
GLUCOSE SERPL-MCNC: 170 MG/DL (ref 65–99)
HBA1C MFR BLD: 9.43 % (ref 4.8–5.6)
HDLC SERPL-MCNC: 40 MG/DL (ref 40–60)
LDLC SERPL CALC-MCNC: 104 MG/DL (ref 0–100)
POTASSIUM SERPL-SCNC: 4.2 MMOL/L (ref 3.5–5.2)
SODIUM SERPL-SCNC: 140 MMOL/L (ref 136–145)
TRIGL SERPL-MCNC: 252 MG/DL (ref 0–150)
VLDLC SERPL CALC-MCNC: 50.4 MG/DL (ref 5–40)

## 2019-01-29 ENCOUNTER — OFFICE VISIT (OUTPATIENT)
Dept: FAMILY MEDICINE CLINIC | Facility: CLINIC | Age: 69
End: 2019-01-29

## 2019-01-29 VITALS
TEMPERATURE: 98.3 F | HEIGHT: 64 IN | DIASTOLIC BLOOD PRESSURE: 76 MMHG | OXYGEN SATURATION: 97 % | SYSTOLIC BLOOD PRESSURE: 122 MMHG | HEART RATE: 57 BPM | WEIGHT: 223 LBS | BODY MASS INDEX: 38.07 KG/M2 | RESPIRATION RATE: 16 BRPM

## 2019-01-29 DIAGNOSIS — E11.65 UNCONTROLLED TYPE 2 DIABETES MELLITUS WITH HYPERGLYCEMIA (HCC): Primary | ICD-10-CM

## 2019-01-29 PROCEDURE — 99213 OFFICE O/P EST LOW 20 MIN: CPT | Performed by: NURSE PRACTITIONER

## 2019-01-29 NOTE — PROGRESS NOTES
Subjective   Bee Tsang is a 68 y.o. female.     Chief Complaint   Patient presents with   • Diabetes     Ms. Tsang presents today to follow up on her diabetes control. She did not bring her glucose log with her today. She reports that her fasting glucose levels have been ranging from 150 +. She had been taking Tresiba and feels that it is not controlling her glucose levels as well as the Levemir did. She has also has gained weight will taking the Tresiba. She is also taking Metformin 1000 mg twice daily for glucose control.  She also reports that she has not been doing water aerobics since November.      I have reviewed the patient's medical history in detail and updated the computerized patient record.    The following portions of the patient's history were reviewed and updated as appropriate: allergies, current medications, past family history, past medical history, past social history, past surgical history and problem list.       Current Outpatient Medications:   •  acetaminophen (TYLENOL ARTHRITIS PAIN) 650 MG 8 hr tablet, Take 650 mg by mouth Every 8 (Eight) Hours As Needed for Mild Pain (1-3)., Disp: , Rfl:   •  albuterol (PROVENTIL HFA;VENTOLIN HFA) 108 (90 BASE) MCG/ACT inhaler, Inhale 2 puffs Every 4 (Four) Hours As Needed for wheezing or shortness of air., Disp: 18 g, Rfl: 3  •  aspirin 81 MG EC tablet, Take 81 mg by mouth., Disp: , Rfl:   •  B Complex Vitamins (B COMPLEX-B12 PO), Take 1 tablet by mouth., Disp: , Rfl:   •  buPROPion XL (WELLBUTRIN XL) 300 MG 24 hr tablet, TAKE ONE TABLET BY MOUTH EVERY DAY, Disp: 30 tablet, Rfl: 5  •  cephalexin (KEFLEX) 500 MG capsule, Take 1 capsule by mouth 2 (Two) Times a Day., Disp: 14 capsule, Rfl: 0  •  Cholecalciferol (VITAMIN D) 1000 units tablet, Take 1,000 Units by mouth., Disp: , Rfl:   •  CloNIDine (CATAPRES) 0.2 MG tablet, TAKE ONE TABLET BY MOUTH TWICE DAILY, Disp: 120 tablet, Rfl: 2  •  EPINEPHrine (EPIPEN) 0.3 MG/0.3ML solution auto-injector  injection, Inject  as directed., Disp: , Rfl:   •  fenofibrate (TRICOR) 48 MG tablet, TAKE ONE TABLET BY MOUTH EVERY DAY, Disp: 30 tablet, Rfl: 5  •  hydrochlorothiazide (HYDRODIURIL) 25 MG tablet, TAKE ONE TABLET BY MOUTH EVERY DAY, Disp: 30 tablet, Rfl: 5  •  HYDROcodone-acetaminophen (NORCO) 7.5-325 MG per tablet, Take 1-2 tablets by mouth., Disp: , Rfl:   •  hydrOXYzine (VISTARIL) 25 MG capsule, Take 1 capsule by mouth 3 (Three) Times a Day As Needed for Itching., Disp: 90 capsule, Rfl: 5  •  ILEVRO 0.3 % suspension, INSTILL ONE Drop IN THE LEFT EYE EVERY DAY FOR TWO WEEKS THEN STOP, Disp: , Rfl: 1  •  Insulin Degludec 200 UNIT/ML solution pen-injector, Inject 45 unit marking on U-100 syringe under the skin into the appropriate area as directed Daily., Disp: , Rfl:   •  insulin detemir (LEVEMIR) 100 UNIT/ML injection, Inject 45 Units under the skin into the appropriate area as directed., Disp: , Rfl:   •  Insulin Pen Needle 32G X 4 MM misc, Use to inject Levemir BID, Disp: 100 each, Rfl: 1  •  Lactobacillus (PROBIOTIC ACIDOPHILUS) capsule, Take 1 capsule by mouth., Disp: , Rfl:   •  magnesium gluconate (MAGONATE) 500 MG tablet, Take 27 mg by mouth 2 (Two) Times a Day., Disp: , Rfl:   •  meloxicam (MOBIC) 7.5 MG tablet, TAKE ONE TABLET BY MOUTH TWICE DAILY, Disp: 60 tablet, Rfl: 3  •  metFORMIN (GLUCOPHAGE) 1000 MG tablet, TAKE ONE TABLET BY MOUTH TWICE DAILY, Disp: 180 tablet, Rfl: 1  •  metoprolol tartrate (LOPRESSOR) 100 MG tablet, TAKE ONE TABLET BY MOUTH TWICE DAILY, Disp: 180 tablet, Rfl: 1  •  Omega-3 Fatty Acids (FISH OIL) 1000 MG capsule capsule, Take 1 capsule by mouth., Disp: , Rfl:   •  ondansetron (ZOFRAN) 4 MG tablet, Take 4 mg by mouth., Disp: , Rfl:   •  prednisoLONE acetate (PRED FORTE) 1 % ophthalmic suspension, INSTILL ONE Drop IN THE LEFT EYE FOUR TIMES DAILY FOR ONE WEEK, THEN THREE TIMES DAILY FOR ONE WEEK, THEN TWICE DAILY FOR ONE WEEK THEN EVER, Disp: , Rfl: 3  •  senna-docusate  "(PERICOLACE) 8.6-50 MG per tablet, Take 2 tablets by mouth., Disp: , Rfl:   •  trimethoprim-polymyxin b (POLYTRIM) 35042-7.1 UNIT/ML-% ophthalmic solution, INSTILL ONE drop FOUR TIMES DAILY IN THE LEFT EYE STARTING THREE DAYS prior TO surgery, USE DAY OF surgery AND continue FOR THREE DAYS follo, Disp: , Rfl: 1  •  vitamin C (ASCORBIC ACID) 500 MG tablet, Take 500 mg by mouth., Disp: , Rfl:     Current Facility-Administered Medications:   •  pneumococcal polysaccharide 23-valent (PNEUMOVAX-23) vaccine 0.5 mL, 0.5 mL, Intramuscular, During Hospitalization, Elizabeth Madrigal, APRN    Review of Systems   Constitutional: Negative.    Respiratory: Negative.    Cardiovascular: Negative.    Endocrine: Positive for polydipsia and polyuria.   Musculoskeletal: Negative.    Skin: Negative.    Neurological: Negative.         Vitals:    01/29/19 0858   BP: 122/76   BP Location: Left arm   Patient Position: Sitting   Cuff Size: Adult   Pulse: 57   Resp: 16   Temp: 98.3 °F (36.8 °C)   TempSrc: Oral   SpO2: 97%   Weight: 101 kg (223 lb)   Height: 161.3 cm (63.5\")       Objective   Physical Exam   Constitutional: She is oriented to person, place, and time. She appears well-developed and well-nourished.   Cardiovascular: Normal rate, regular rhythm and normal heart sounds.   Pulmonary/Chest: Effort normal and breath sounds normal.   Neurological: She is alert and oriented to person, place, and time.   Skin: Skin is warm and dry.   Psychiatric:   No acute distress   Vitals reviewed.        Assessment/Plan   Bee was seen today for diabetes.    Diagnoses and all orders for this visit:    Uncontrolled type 2 diabetes mellitus with hyperglycemia (CMS/[facility])    Other orders  -     Dulaglutide 1.5 MG/0.5ML solution pen-injector; Inject 1.5 mg under the skin into the appropriate area as directed 1 (One) Time Per Week.    1. I have reviewed her labs with her today. Her A1C is 9.43 which is up from 8.1 three months ago. She is to resume taking " Levemir 45 units daily and the Metformin 1000 mg daily. She reports that when she was taking Victoza she had had chest pain. She felt it was the Victoza that caused the chest pain so she stopped taking it. She is will to try Trulicity weekly. I have provided her with a sample of 1.5 mg pens x 2 weeks and then also a 30 day free trial. She is to report to me if she is having any adverse affects.   2. Her lipid levels are improving. She is to continue with Fenofibrate 48 mg daily for her lipid control.   3. She is to return in 6 weeks to follow up on her glucose control and has been instructed to bring her glucose log with her.

## 2019-02-10 DIAGNOSIS — E78.5 DYSLIPIDEMIA: ICD-10-CM

## 2019-02-11 RX ORDER — FENOFIBRATE 48 MG/1
TABLET, COATED ORAL
Qty: 30 TABLET | Refills: 5 | Status: SHIPPED | OUTPATIENT
Start: 2019-02-11 | End: 2019-09-26 | Stop reason: SDUPTHER

## 2019-02-22 DIAGNOSIS — E11.65 UNCONTROLLED TYPE 2 DIABETES MELLITUS WITH HYPERGLYCEMIA (HCC): Primary | ICD-10-CM

## 2019-02-22 RX ORDER — SYRINGE-NEEDLE,INSULIN,0.5 ML 27GX1/2"
SYRINGE, EMPTY DISPOSABLE MISCELLANEOUS
Qty: 100 EACH | Refills: 0 | Status: SHIPPED | OUTPATIENT
Start: 2019-02-22

## 2019-03-12 ENCOUNTER — OFFICE VISIT (OUTPATIENT)
Dept: FAMILY MEDICINE CLINIC | Facility: CLINIC | Age: 69
End: 2019-03-12

## 2019-03-12 VITALS
HEART RATE: 60 BPM | DIASTOLIC BLOOD PRESSURE: 78 MMHG | HEIGHT: 64 IN | SYSTOLIC BLOOD PRESSURE: 122 MMHG | TEMPERATURE: 98.5 F | OXYGEN SATURATION: 98 % | RESPIRATION RATE: 16 BRPM | BODY MASS INDEX: 38.24 KG/M2 | WEIGHT: 224 LBS

## 2019-03-12 DIAGNOSIS — I10 ESSENTIAL HYPERTENSION: ICD-10-CM

## 2019-03-12 DIAGNOSIS — E11.65 UNCONTROLLED TYPE 2 DIABETES MELLITUS WITH HYPERGLYCEMIA (HCC): Primary | ICD-10-CM

## 2019-03-12 PROCEDURE — 99213 OFFICE O/P EST LOW 20 MIN: CPT | Performed by: NURSE PRACTITIONER

## 2019-03-12 RX ORDER — CLONIDINE HYDROCHLORIDE 0.2 MG/1
TABLET ORAL
Qty: 120 TABLET | Refills: 2 | Status: SHIPPED | OUTPATIENT
Start: 2019-03-12 | End: 2019-09-17 | Stop reason: SDUPTHER

## 2019-03-12 NOTE — PROGRESS NOTES
Subjective   Bee Tsang is a 68 y.o. female.     Chief Complaint   Patient presents with   • Diabetes     6 week f/u     Ms. Tsang presents today to follow up on her diabetes control. I had seen her 6 weeks ago and I had asked her to return today and bring her glucose log with her to this visit. She is currently taking Levemir  45 units nightly, Metformin 1000 mg twice a day and Trulicity 1.5/0.5 ml daily.     I have reviewed the patient's medical history in detail and updated the computerized patient record.     The following portions of the patient's history were reviewed and updated as appropriate: allergies, current medications, past family history, past medical history, past social history, past surgical history and problem list.       Current Outpatient Medications:   •  acetaminophen (TYLENOL ARTHRITIS PAIN) 650 MG 8 hr tablet, Take 650 mg by mouth Every 8 (Eight) Hours As Needed for Mild Pain (1-3)., Disp: , Rfl:   •  albuterol (PROVENTIL HFA;VENTOLIN HFA) 108 (90 BASE) MCG/ACT inhaler, Inhale 2 puffs Every 4 (Four) Hours As Needed for wheezing or shortness of air., Disp: 18 g, Rfl: 3  •  aspirin 81 MG EC tablet, Take 81 mg by mouth., Disp: , Rfl:   •  B Complex Vitamins (B COMPLEX-B12 PO), Take 1 tablet by mouth., Disp: , Rfl:   •  buPROPion XL (WELLBUTRIN XL) 300 MG 24 hr tablet, TAKE ONE TABLET BY MOUTH EVERY DAY, Disp: 30 tablet, Rfl: 5  •  Cholecalciferol (VITAMIN D) 1000 units tablet, Take 1,000 Units by mouth., Disp: , Rfl:   •  CloNIDine (CATAPRES) 0.2 MG tablet, TAKE ONE TABLET BY MOUTH TWICE DAILY, Disp: 120 tablet, Rfl: 2  •  Dulaglutide 1.5 MG/0.5ML solution pen-injector, Inject 1.5 mg under the skin into the appropriate area as directed 1 (One) Time Per Week., Disp: 4 pen, Rfl: 0  •  EPINEPHrine (EPIPEN) 0.3 MG/0.3ML solution auto-injector injection, Inject  as directed., Disp: , Rfl:   •  fenofibrate (TRICOR) 48 MG tablet, TAKE ONE TABLET BY MOUTH EVERY DAY, Disp: 30 tablet, Rfl: 5  •   "hydrochlorothiazide (HYDRODIURIL) 25 MG tablet, TAKE ONE TABLET BY MOUTH EVERY DAY, Disp: 30 tablet, Rfl: 5  •  HYDROcodone-acetaminophen (NORCO) 7.5-325 MG per tablet, Take 1-2 tablets by mouth., Disp: , Rfl:   •  hydrOXYzine (VISTARIL) 25 MG capsule, Take 1 capsule by mouth 3 (Three) Times a Day As Needed for Itching., Disp: 90 capsule, Rfl: 5  •  ILEVRO 0.3 % suspension, INSTILL ONE Drop IN THE LEFT EYE EVERY DAY FOR TWO WEEKS THEN STOP, Disp: , Rfl: 1  •  insulin detemir (LEVEMIR) 100 UNIT/ML injection, Inject 45 Units under the skin into the appropriate area as directed Every Night., Disp: 2 each, Rfl: 5  •  Insulin Pen Needle 32G X 4 MM misc, Use to inject Levemir BID, Disp: 100 each, Rfl: 1  •  Insulin Syringe-Needle U-100 (B-D INS SYR ULTRAFINE 1CC/31G) 31G X 5/16\" 1 ML misc, Use to inject insulin sq nightly, Disp: 100 each, Rfl: 0  •  Lactobacillus (PROBIOTIC ACIDOPHILUS) capsule, Take 1 capsule by mouth., Disp: , Rfl:   •  magnesium gluconate (MAGONATE) 500 MG tablet, Take 27 mg by mouth 2 (Two) Times a Day., Disp: , Rfl:   •  meloxicam (MOBIC) 7.5 MG tablet, TAKE ONE TABLET BY MOUTH TWICE DAILY, Disp: 60 tablet, Rfl: 3  •  metFORMIN (GLUCOPHAGE) 1000 MG tablet, Take 1 tablet by mouth 2 (Two) Times a Day., Disp: 180 tablet, Rfl: 3  •  metoprolol tartrate (LOPRESSOR) 100 MG tablet, TAKE ONE TABLET BY MOUTH TWICE DAILY, Disp: 180 tablet, Rfl: 1  •  Omega-3 Fatty Acids (FISH OIL) 1000 MG capsule capsule, Take 1 capsule by mouth., Disp: , Rfl:   •  ondansetron (ZOFRAN) 4 MG tablet, Take 4 mg by mouth., Disp: , Rfl:   •  prednisoLONE acetate (PRED FORTE) 1 % ophthalmic suspension, INSTILL ONE Drop IN THE LEFT EYE FOUR TIMES DAILY FOR ONE WEEK, THEN THREE TIMES DAILY FOR ONE WEEK, THEN TWICE DAILY FOR ONE WEEK THEN EVER, Disp: , Rfl: 3  •  senna-docusate (PERICOLACE) 8.6-50 MG per tablet, Take 2 tablets by mouth., Disp: , Rfl:   •  trimethoprim-polymyxin b (POLYTRIM) 55551-0.1 UNIT/ML-% ophthalmic solution, " INSTILL ONE drop FOUR TIMES DAILY IN THE LEFT EYE STARTING THREE DAYS prior TO surgery, USE DAY OF surgery AND continue FOR THREE DAYS follo, Disp: , Rfl: 1  •  vitamin C (ASCORBIC ACID) 500 MG tablet, Take 500 mg by mouth., Disp: , Rfl:     Review of Systems   Constitutional: Negative.    Respiratory: Negative.    Cardiovascular: Negative.    Endocrine: Negative.    Musculoskeletal: Negative.    Skin: Negative.    Neurological: Negative.        Objective    Vitals:    03/12/19 0848   BP: 122/78   Pulse: 60   Resp: 16   Temp: 98.5 °F (36.9 °C)   SpO2: 98%     Physical Exam   Constitutional: She is oriented to person, place, and time. She appears well-developed and well-nourished.   Cardiovascular: Normal rate, regular rhythm and normal heart sounds.   Pulmonary/Chest: Effort normal and breath sounds normal.   Musculoskeletal: Normal range of motion. She exhibits no edema.    Bee had a diabetic foot exam performed today.   During the foot exam she had a monofilament test performed.    Neurological Sensory Findings - Unaltered hot/cold right ankle/foot discrimination and unaltered hot/cold left ankle/foot discrimination. Altered sharp/dull right ankle/foot discrimination and altered sharp/dull left ankle/foot discrimination. Right ankle/foot altered proprioception and left ankle/foot altered proprioception.  Vascular Status -  Her right foot exhibits normal foot vasculature  and no edema. Her left foot exhibits normal foot vasculature  and no edema.  Skin Integrity  -  Her right foot skin is intact.Her left foot skin is intact..  Neurological: She is alert and oriented to person, place, and time.   Skin: Skin is warm and dry.   Psychiatric:   No acute distress   Vitals reviewed.        Assessment/Plan   Bee was seen today for diabetes.    Diagnoses and all orders for this visit:    Uncontrolled type 2 diabetes mellitus with hyperglycemia (CMS/[facility])      1. I have reviewed the glucose log that she brought with her  today. She only tests her glucose levels in the morning. Her fasting glucose levels are ranging from 159 to 238. She is to continue with Metformin 1000 mg twice a day, and Trulicity 1.5 mg daily. She is to increase the Levemir to 50 units nightly.   2. I have asked her to test her glucose levels daily alternating between breakfast, lunch, supper and bed time.  3. Follow up in 6 weeks on her glucose levels.

## 2019-03-18 RX ORDER — BUPROPION HYDROCHLORIDE 300 MG/1
TABLET ORAL
Qty: 30 TABLET | Refills: 5 | Status: SHIPPED | OUTPATIENT
Start: 2019-03-18 | End: 2019-09-26 | Stop reason: SDUPTHER

## 2019-04-02 RX ORDER — DULAGLUTIDE 1.5 MG/.5ML
INJECTION, SOLUTION SUBCUTANEOUS
Qty: 4 ML | Refills: 5 | Status: SHIPPED | OUTPATIENT
Start: 2019-04-02 | End: 2019-04-24 | Stop reason: ALTCHOICE

## 2019-04-24 ENCOUNTER — OFFICE VISIT (OUTPATIENT)
Dept: FAMILY MEDICINE CLINIC | Facility: CLINIC | Age: 69
End: 2019-04-24

## 2019-04-24 VITALS
HEIGHT: 64 IN | TEMPERATURE: 98.3 F | BODY MASS INDEX: 38.41 KG/M2 | DIASTOLIC BLOOD PRESSURE: 82 MMHG | OXYGEN SATURATION: 98 % | RESPIRATION RATE: 16 BRPM | HEART RATE: 64 BPM | WEIGHT: 225 LBS | SYSTOLIC BLOOD PRESSURE: 130 MMHG

## 2019-04-24 DIAGNOSIS — E11.9 TYPE 2 DIABETES MELLITUS WITHOUT COMPLICATION, WITH LONG-TERM CURRENT USE OF INSULIN (HCC): Primary | ICD-10-CM

## 2019-04-24 DIAGNOSIS — Z11.1 SCREENING FOR TUBERCULOSIS: ICD-10-CM

## 2019-04-24 DIAGNOSIS — Z79.4 TYPE 2 DIABETES MELLITUS WITHOUT COMPLICATION, WITH LONG-TERM CURRENT USE OF INSULIN (HCC): Primary | ICD-10-CM

## 2019-04-24 LAB — HBA1C MFR BLD: 10.4 %

## 2019-04-24 PROCEDURE — 99214 OFFICE O/P EST MOD 30 MIN: CPT | Performed by: NURSE PRACTITIONER

## 2019-04-24 PROCEDURE — 36416 COLLJ CAPILLARY BLOOD SPEC: CPT | Performed by: NURSE PRACTITIONER

## 2019-04-24 PROCEDURE — 83036 HEMOGLOBIN GLYCOSYLATED A1C: CPT | Performed by: NURSE PRACTITIONER

## 2019-04-26 LAB
GAMMA INTERFERON BACKGROUND BLD IA-ACNC: 0.02 IU/ML
M TB IFN-G BLD-IMP: NEGATIVE
M TB IFN-G CD4+ BCKGRND COR BLD-ACNC: 0.02 IU/ML
MITOGEN IGNF BLD-ACNC: >10 IU/ML
QUANTIFERON INCUBATION: NORMAL
QUANTIFERON TB2 AG VALUE: 0.02 IU/ML
SERVICE CMNT-IMP: NORMAL

## 2019-05-01 ENCOUNTER — OFFICE VISIT (OUTPATIENT)
Dept: FAMILY MEDICINE CLINIC | Facility: CLINIC | Age: 69
End: 2019-05-01

## 2019-05-01 VITALS
WEIGHT: 224 LBS | HEIGHT: 64 IN | SYSTOLIC BLOOD PRESSURE: 128 MMHG | DIASTOLIC BLOOD PRESSURE: 80 MMHG | HEART RATE: 64 BPM | RESPIRATION RATE: 16 BRPM | TEMPERATURE: 98 F | OXYGEN SATURATION: 98 % | BODY MASS INDEX: 38.24 KG/M2

## 2019-05-01 DIAGNOSIS — Z00.00 PHYSICAL EXAM: Primary | ICD-10-CM

## 2019-05-01 PROCEDURE — 99213 OFFICE O/P EST LOW 20 MIN: CPT | Performed by: NURSE PRACTITIONER

## 2019-05-01 RX ORDER — CEPHALEXIN 500 MG/1
500 CAPSULE ORAL 2 TIMES DAILY
COMMUNITY
End: 2019-06-11

## 2019-05-01 NOTE — PROGRESS NOTES
Subjective   Bee Tsang is a 68 y.o. female.     Ms. 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 She needs to have her paper work filled out today.    I have reviewed the patient's medical history in detail and updated the computerized patient record.     The following portions of the patient's history were reviewed and updated as appropriate: allergies, current medications, past family history, past medical history, past social history, past surgical history and problem list.       Current Outpatient Medications:   •  acetaminophen (TYLENOL ARTHRITIS PAIN) 650 MG 8 hr tablet, Take 650 mg by mouth Every 8 (Eight) Hours As Needed for Mild Pain (1-3)., Disp: , Rfl:   •  albuterol (PROVENTIL HFA;VENTOLIN HFA) 108 (90 BASE) MCG/ACT inhaler, Inhale 2 puffs Every 4 (Four) Hours As Needed for wheezing or shortness of air., Disp: 18 g, Rfl: 3  •  aspirin 81 MG EC tablet, Take 81 mg by mouth., Disp: , Rfl:   •  B Complex Vitamins (B COMPLEX-B12 PO), Take 1 tablet by mouth., Disp: , Rfl:   •  buPROPion XL (WELLBUTRIN XL) 300 MG 24 hr tablet, TAKE ONE TABLET BY MOUTH EVERY DAY, Disp: 30 tablet, Rfl: 5  •  cephalexin (KEFLEX) 500 MG capsule, Take 500 mg by mouth 2 (Two) Times a Day., Disp: , Rfl:   •  Cholecalciferol (VITAMIN D) 1000 units tablet, Take 1,000 Units by mouth., Disp: , Rfl:   •  CloNIDine (CATAPRES) 0.2 MG tablet, TAKE ONE TABLET BY MOUTH TWICE DAILY, Disp: 120 tablet, Rfl: 2  •  EPINEPHrine (EPIPEN) 0.3 MG/0.3ML solution auto-injector injection, Inject  as directed., Disp: , Rfl:   •  exenatide er (BYDUREON) 2 MG pen-injector injection, Inject 1 pen under the skin into the appropriate area as directed 1 (One) Time Per Week., Disp: 4 pen, Rfl: 5  •  fenofibrate (TRICOR) 48 MG tablet, TAKE ONE TABLET BY MOUTH EVERY DAY, Disp: 30 tablet, Rfl: 5  •  hydrochlorothiazide (HYDRODIURIL) 25 MG tablet, TAKE ONE TABLET BY MOUTH EVERY DAY, Disp: 30 tablet, Rfl:  "5  •  HYDROcodone-acetaminophen (NORCO) 7.5-325 MG per tablet, Take 1-2 tablets by mouth., Disp: , Rfl:   •  hydrOXYzine (VISTARIL) 25 MG capsule, Take 1 capsule by mouth 3 (Three) Times a Day As Needed for Itching., Disp: 90 capsule, Rfl: 5  •  ILEVRO 0.3 % suspension, INSTILL ONE Drop IN THE LEFT EYE EVERY DAY FOR TWO WEEKS THEN STOP, Disp: , Rfl: 1  •  insulin detemir (LEVEMIR) 100 UNIT/ML injection, Inject 60 Units under the skin into the appropriate area as directed Daily., Disp: , Rfl:   •  Insulin Pen Needle 32G X 4 MM misc, Use to inject Levemir BID, Disp: 100 each, Rfl: 1  •  Insulin Syringe-Needle U-100 (B-D INS SYR ULTRAFINE 1CC/31G) 31G X 5/16\" 1 ML misc, Use to inject insulin sq nightly, Disp: 100 each, Rfl: 0  •  Lactobacillus (PROBIOTIC ACIDOPHILUS) capsule, Take 1 capsule by mouth., Disp: , Rfl:   •  magnesium gluconate (MAGONATE) 500 MG tablet, Take 27 mg by mouth 2 (Two) Times a Day., Disp: , Rfl:   •  meloxicam (MOBIC) 7.5 MG tablet, TAKE ONE TABLET BY MOUTH TWICE DAILY, Disp: 60 tablet, Rfl: 3  •  metFORMIN (GLUCOPHAGE) 1000 MG tablet, Take 1 tablet by mouth 2 (Two) Times a Day., Disp: 180 tablet, Rfl: 3  •  metoprolol tartrate (LOPRESSOR) 100 MG tablet, TAKE ONE TABLET BY MOUTH TWICE DAILY, Disp: 180 tablet, Rfl: 1  •  Omega-3 Fatty Acids (FISH OIL) 1000 MG capsule capsule, Take 1 capsule by mouth., Disp: , Rfl:   •  ondansetron (ZOFRAN) 4 MG tablet, Take 4 mg by mouth., Disp: , Rfl:   •  prednisoLONE acetate (PRED FORTE) 1 % ophthalmic suspension, INSTILL ONE Drop IN THE LEFT EYE FOUR TIMES DAILY FOR ONE WEEK, THEN THREE TIMES DAILY FOR ONE WEEK, THEN TWICE DAILY FOR ONE WEEK THEN EVER, Disp: , Rfl: 3  •  senna-docusate (PERICOLACE) 8.6-50 MG per tablet, Take 2 tablets by mouth., Disp: , Rfl:   •  trimethoprim-polymyxin b (POLYTRIM) 59081-3.1 UNIT/ML-% ophthalmic solution, INSTILL ONE drop FOUR TIMES DAILY IN THE LEFT EYE STARTING THREE DAYS prior TO surgery, USE DAY OF surgery AND continue FOR " "THREE DAYS follo, Disp: , Rfl: 1  •  vitamin C (ASCORBIC ACID) 500 MG tablet, Take 500 mg by mouth., Disp: , Rfl:     Review of Systems   Constitutional: Negative.    Eyes: Negative.    Cardiovascular: Negative.    Gastrointestinal: Negative.    Musculoskeletal: Negative.    Skin: Negative.    Psychiatric/Behavioral: Negative.    All other systems reviewed and are negative.       Vitals:    05/01/19 1318   BP: 128/80   BP Location: Left arm   Patient Position: Sitting   Cuff Size: Adult   Pulse: 64   Resp: 16   Temp: 98 °F (36.7 °C)   TempSrc: Oral   SpO2: 98%   Weight: 102 kg (224 lb)   Height: 161.3 cm (63.5\")       Objective   Physical Exam   Constitutional: She is oriented to person, place, and time. She appears well-developed and well-nourished.   Cardiovascular: Normal rate, regular rhythm, normal heart sounds and intact distal pulses.   Pulmonary/Chest: Breath sounds normal.   Musculoskeletal: Normal range of motion. She exhibits no edema.   Neurological: She is alert and oriented to person, place, and time.   Skin: Skin is warm and dry.   Psychiatric:   No acute distress   Vitals reviewed.        Assessment/Plan   Diagnoses and all orders for this visit:    Physical exam      Her physical exam and mental health are within normal limits.   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 Refer to the scanned documentation.         "

## 2019-05-29 RX ORDER — METOPROLOL TARTRATE 100 MG/1
TABLET ORAL
Qty: 180 TABLET | Refills: 1 | Status: SHIPPED | OUTPATIENT
Start: 2019-05-29 | End: 2019-06-11 | Stop reason: ALTCHOICE

## 2019-06-11 ENCOUNTER — OFFICE VISIT (OUTPATIENT)
Dept: FAMILY MEDICINE CLINIC | Facility: CLINIC | Age: 69
End: 2019-06-11

## 2019-06-11 VITALS
DIASTOLIC BLOOD PRESSURE: 72 MMHG | WEIGHT: 218.3 LBS | BODY MASS INDEX: 37.27 KG/M2 | HEIGHT: 64 IN | SYSTOLIC BLOOD PRESSURE: 132 MMHG | HEART RATE: 70 BPM | RESPIRATION RATE: 16 BRPM | TEMPERATURE: 98.3 F | OXYGEN SATURATION: 99 %

## 2019-06-11 DIAGNOSIS — I10 ESSENTIAL HYPERTENSION: Primary | ICD-10-CM

## 2019-06-11 PROCEDURE — 99213 OFFICE O/P EST LOW 20 MIN: CPT | Performed by: NURSE PRACTITIONER

## 2019-06-11 RX ORDER — HYDROCHLOROTHIAZIDE 50 MG/1
50 TABLET ORAL DAILY
Refills: 0 | COMMUNITY
Start: 2019-06-05 | End: 2019-06-11 | Stop reason: DRUGHIGH

## 2019-06-11 RX ORDER — METOPROLOL SUCCINATE 200 MG/1
200 TABLET, EXTENDED RELEASE ORAL DAILY
Qty: 90 TABLET | Refills: 1 | Status: SHIPPED | OUTPATIENT
Start: 2019-06-11 | End: 2019-12-17 | Stop reason: SDUPTHER

## 2019-06-11 RX ORDER — METOPROLOL SUCCINATE 200 MG/1
200 TABLET, EXTENDED RELEASE ORAL DAILY
Refills: 0 | COMMUNITY
Start: 2019-06-05 | End: 2019-06-11 | Stop reason: SDUPTHER

## 2019-06-11 RX ORDER — HYDROCHLOROTHIAZIDE 50 MG/1
50 TABLET ORAL DAILY
Qty: 90 TABLET | Refills: 1 | Status: SHIPPED | OUTPATIENT
Start: 2019-06-11 | End: 2019-12-23

## 2019-06-11 NOTE — PROGRESS NOTES
Subjective   Bee Tsang is a 68 y.o. female.     Chief Complaint   Patient presents with   • Hypertension     Ms. Tsang presents today to follow up on a recent [place] visit at [facility] [place]. She states she had gone to the dentist for a procedure, her blood pressure was very high so the procedure was canceled. She the went to the ER. Her Metoprolol XL was increase to 200 mg daily and the HCTZ was also increased to 50 mg daily.     I have reviewed the patient's medical history in detail and updated the computerized patient record.    The following portions of the patient's history were reviewed and updated as appropriate: allergies, current medications, past family history, past medical history, past social history, past surgical history and problem list.       Current Outpatient Medications:   •  acetaminophen (TYLENOL ARTHRITIS PAIN) 650 MG 8 hr tablet, Take 650 mg by mouth Every 8 (Eight) Hours As Needed for Mild Pain (1-3)., Disp: , Rfl:   •  albuterol (PROVENTIL HFA;VENTOLIN HFA) 108 (90 BASE) MCG/ACT inhaler, Inhale 2 puffs Every 4 (Four) Hours As Needed for wheezing or shortness of air., Disp: 18 g, Rfl: 3  •  aspirin 81 MG EC tablet, Take 81 mg by mouth., Disp: , Rfl:   •  B Complex Vitamins (B COMPLEX-B12 PO), Take 1 tablet by mouth., Disp: , Rfl:   •  buPROPion XL (WELLBUTRIN XL) 300 MG 24 hr tablet, TAKE ONE TABLET BY MOUTH EVERY DAY, Disp: 30 tablet, Rfl: 5  •  Cholecalciferol (VITAMIN D) 1000 units tablet, Take 1,000 Units by mouth., Disp: , Rfl:   •  CloNIDine (CATAPRES) 0.2 MG tablet, TAKE ONE TABLET BY MOUTH TWICE DAILY, Disp: 120 tablet, Rfl: 2  •  EPINEPHrine (EPIPEN) 0.3 MG/0.3ML solution auto-injector injection, Inject  as directed., Disp: , Rfl:   •  exenatide er (BYDUREON) 2 MG pen-injector injection, Inject 1 pen under the skin into the appropriate area as directed 1 (One) Time Per Week., Disp: 4 pen, Rfl: 5  •  fenofibrate (TRICOR) 48 MG tablet, TAKE ONE TABLET BY MOUTH EVERY DAY,  "Disp: 30 tablet, Rfl: 5  •  hydrochlorothiazide (HYDRODIURIL) 50 MG tablet, Take 50 mg by mouth Daily., Disp: , Rfl: 0  •  HYDROcodone-acetaminophen (NORCO) 7.5-325 MG per tablet, Take 1-2 tablets by mouth., Disp: , Rfl:   •  hydrOXYzine (VISTARIL) 25 MG capsule, Take 1 capsule by mouth 3 (Three) Times a Day As Needed for Itching., Disp: 90 capsule, Rfl: 5  •  ILEVRO 0.3 % suspension, INSTILL ONE Drop IN THE LEFT EYE EVERY DAY FOR TWO WEEKS THEN STOP, Disp: , Rfl: 1  •  insulin detemir (LEVEMIR) 100 UNIT/ML injection, Inject 60 Units under the skin into the appropriate area as directed Daily., Disp: , Rfl:   •  Insulin Pen Needle 32G X 4 MM misc, Use to inject Levemir BID, Disp: 100 each, Rfl: 1  •  Insulin Syringe-Needle U-100 (B-D INS SYR ULTRAFINE 1CC/31G) 31G X 5/16\" 1 ML misc, Use to inject insulin sq nightly, Disp: 100 each, Rfl: 0  •  Lactobacillus (PROBIOTIC ACIDOPHILUS) capsule, Take 1 capsule by mouth., Disp: , Rfl:   •  magnesium gluconate (MAGONATE) 500 MG tablet, Take 27 mg by mouth 2 (Two) Times a Day., Disp: , Rfl:   •  meloxicam (MOBIC) 7.5 MG tablet, TAKE ONE TABLET BY MOUTH TWICE DAILY, Disp: 60 tablet, Rfl: 3  •  metFORMIN (GLUCOPHAGE) 1000 MG tablet, Take 1 tablet by mouth 2 (Two) Times a Day., Disp: 180 tablet, Rfl: 3  •  metoprolol succinate XL (TOPROL-XL) 200 MG 24 hr tablet, Take 200 mg by mouth Daily., Disp: , Rfl: 0  •  Omega-3 Fatty Acids (FISH OIL) 1000 MG capsule capsule, Take 1 capsule by mouth., Disp: , Rfl:   •  ondansetron (ZOFRAN) 4 MG tablet, Take 4 mg by mouth., Disp: , Rfl:   •  prednisoLONE acetate (PRED FORTE) 1 % ophthalmic suspension, INSTILL ONE Drop IN THE LEFT EYE FOUR TIMES DAILY FOR ONE WEEK, THEN THREE TIMES DAILY FOR ONE WEEK, THEN TWICE DAILY FOR ONE WEEK THEN EVER, Disp: , Rfl: 3  •  senna-docusate (PERICOLACE) 8.6-50 MG per tablet, Take 2 tablets by mouth., Disp: , Rfl:   •  trimethoprim-polymyxin b (POLYTRIM) 01560-4.1 UNIT/ML-% ophthalmic solution, INSTILL ONE " "drop FOUR TIMES DAILY IN THE LEFT EYE STARTING THREE DAYS prior TO surgery, USE DAY OF surgery AND continue FOR THREE DAYS follo, Disp: , Rfl: 1  •  vitamin C (ASCORBIC ACID) 500 MG tablet, Take 500 mg by mouth., Disp: , Rfl:   •  cephalexin (KEFLEX) 500 MG capsule, Take 500 mg by mouth 2 (Two) Times a Day., Disp: , Rfl:   •  hydrochlorothiazide (HYDRODIURIL) 25 MG tablet, TAKE ONE TABLET BY MOUTH EVERY DAY, Disp: 30 tablet, Rfl: 5  •  metoprolol tartrate (LOPRESSOR) 100 MG tablet, TAKE ONE TABLET BY MOUTH TWICE DAILY, Disp: 180 tablet, Rfl: 1    Review of Systems   Constitutional: Negative.    Eyes: Negative.    Respiratory: Negative.    Cardiovascular: Negative.    Neurological: Negative.         Vitals:    06/11/19 1359   BP: 140/86   BP Location: Left arm   Patient Position: Sitting   Cuff Size: Adult   Pulse: 70   Resp: 16   Temp: 98.3 °F (36.8 °C)   TempSrc: Oral   SpO2: 99%   Weight: 99 kg (218 lb 4.8 oz)   Height: 161.3 cm (63.5\")       Objective   Physical Exam   Constitutional: She is oriented to person, place, and time. She appears well-developed and well-nourished.   Cardiovascular: Normal rate, regular rhythm, normal heart sounds and intact distal pulses.   Pulmonary/Chest: Effort normal and breath sounds normal.   Neurological: She is alert and oriented to person, place, and time.   Skin: Skin is warm and dry.   Psychiatric:   No acute distress   Vitals reviewed.        Assessment/Plan   Bee was seen today for hypertension.    Diagnoses and all orders for this visit:    Essential hypertension    Other orders  -     hydrochlorothiazide (HYDRODIURIL) 50 MG tablet; Take 1 tablet by mouth Daily.  -     metoprolol succinate XL (TOPROL-XL) 200 MG 24 hr tablet; Take 1 tablet by mouth Daily.      I have reviewed and reconciled her medications with her today.  She is to continue all medications as prescribed.   Her blood pressure today is 146/86.   She is to follow up at her next scheduled appointment on " 7/23/19.

## 2019-06-27 RX ORDER — HYDROCHLOROTHIAZIDE 25 MG/1
TABLET ORAL
Qty: 30 TABLET | Refills: 5 | OUTPATIENT
Start: 2019-06-27

## 2019-06-28 RX ORDER — INSULIN DETEMIR 100 [IU]/ML
INJECTION, SOLUTION SUBCUTANEOUS
Qty: 10 ML | Refills: 5 | Status: SHIPPED | OUTPATIENT
Start: 2019-06-28 | End: 2019-10-05 | Stop reason: SDUPTHER

## 2019-07-24 ENCOUNTER — OFFICE VISIT (OUTPATIENT)
Dept: FAMILY MEDICINE CLINIC | Facility: CLINIC | Age: 69
End: 2019-07-24

## 2019-07-24 VITALS
BODY MASS INDEX: 35.75 KG/M2 | OXYGEN SATURATION: 98 % | TEMPERATURE: 98.3 F | HEIGHT: 64 IN | HEART RATE: 72 BPM | SYSTOLIC BLOOD PRESSURE: 130 MMHG | WEIGHT: 209.4 LBS | RESPIRATION RATE: 16 BRPM | DIASTOLIC BLOOD PRESSURE: 84 MMHG

## 2019-07-24 DIAGNOSIS — E11.9 TYPE 2 DIABETES MELLITUS WITHOUT COMPLICATION, WITH LONG-TERM CURRENT USE OF INSULIN (HCC): Primary | ICD-10-CM

## 2019-07-24 DIAGNOSIS — E66.01 MORBID OBESITY (HCC): ICD-10-CM

## 2019-07-24 DIAGNOSIS — Z79.4 TYPE 2 DIABETES MELLITUS WITHOUT COMPLICATION, WITH LONG-TERM CURRENT USE OF INSULIN (HCC): Primary | ICD-10-CM

## 2019-07-24 LAB — HBA1C MFR BLD: 8.6 %

## 2019-07-24 PROCEDURE — 83036 HEMOGLOBIN GLYCOSYLATED A1C: CPT | Performed by: NURSE PRACTITIONER

## 2019-07-24 PROCEDURE — 99213 OFFICE O/P EST LOW 20 MIN: CPT | Performed by: NURSE PRACTITIONER

## 2019-07-24 NOTE — PROGRESS NOTES
Subjective   Bee Tsang is a 68 y.o. female.     Chief Complaint   Patient presents with   • Diabetes     Diabetes   She presents for her follow-up diabetic visit. She has type 2 diabetes mellitus. Her disease course has been improving. There are no hypoglycemic associated symptoms. Associated symptoms include fatigue. Pertinent negatives for diabetes include no blurred vision, no chest pain, no polydipsia, no polyphagia, no polyuria and no visual change. There are no hypoglycemic complications. Symptoms are stable. There are no diabetic complications. Risk factors for coronary artery disease include obesity, post-menopausal, diabetes mellitus and dyslipidemia. Current diabetic treatment includes insulin injections and oral agent (monotherapy). She is compliant with treatment most of the time. She is following a generally healthy diet. She has not had a previous visit with a dietitian. An ACE inhibitor/angiotensin II receptor blocker is being taken. Eye exam is current.   Obesity   This is a chronic problem. The current episode started more than 1 year ago. The problem occurs daily. The problem has been waxing and waning. Associated symptoms include fatigue. Pertinent negatives include no chest pain or visual change. Exacerbated by: Diabetes. Treatments tried: diet and exercise. The treatment provided mild relief.      I have reviewed the patient's medical history in detail and updated the computerized patient record.    The following portions of the patient's history were reviewed and updated as appropriate: allergies, current medications, past family history, past medical history, past social history, past surgical history and problem list.       Current Outpatient Medications:   •  acetaminophen (TYLENOL ARTHRITIS PAIN) 650 MG 8 hr tablet, Take 650 mg by mouth Every 8 (Eight) Hours As Needed for Mild Pain (1-3)., Disp: , Rfl:   •  albuterol (PROVENTIL HFA;VENTOLIN HFA) 108 (90 BASE) MCG/ACT inhaler, Inhale  "2 puffs Every 4 (Four) Hours As Needed for wheezing or shortness of air., Disp: 18 g, Rfl: 3  •  aspirin 81 MG EC tablet, Take 81 mg by mouth., Disp: , Rfl:   •  B Complex Vitamins (B COMPLEX-B12 PO), Take 1 tablet by mouth., Disp: , Rfl:   •  buPROPion XL (WELLBUTRIN XL) 300 MG 24 hr tablet, TAKE ONE TABLET BY MOUTH EVERY DAY, Disp: 30 tablet, Rfl: 5  •  Cholecalciferol (VITAMIN D) 1000 units tablet, Take 1,000 Units by mouth., Disp: , Rfl:   •  CloNIDine (CATAPRES) 0.2 MG tablet, TAKE ONE TABLET BY MOUTH TWICE DAILY, Disp: 120 tablet, Rfl: 2  •  EPINEPHrine (EPIPEN) 0.3 MG/0.3ML solution auto-injector injection, Inject  as directed., Disp: , Rfl:   •  exenatide er (BYDUREON) 2 MG pen-injector injection, Inject 1 pen under the skin into the appropriate area as directed 1 (One) Time Per Week., Disp: 4 pen, Rfl: 5  •  fenofibrate (TRICOR) 48 MG tablet, TAKE ONE TABLET BY MOUTH EVERY DAY, Disp: 30 tablet, Rfl: 5  •  hydrochlorothiazide (HYDRODIURIL) 50 MG tablet, Take 1 tablet by mouth Daily., Disp: 90 tablet, Rfl: 1  •  HYDROcodone-acetaminophen (NORCO) 7.5-325 MG per tablet, Take 1-2 tablets by mouth., Disp: , Rfl:   •  hydrOXYzine (VISTARIL) 25 MG capsule, Take 1 capsule by mouth 3 (Three) Times a Day As Needed for Itching., Disp: 90 capsule, Rfl: 5  •  ILEVRO 0.3 % suspension, INSTILL ONE Drop IN THE LEFT EYE EVERY DAY FOR TWO WEEKS THEN STOP, Disp: , Rfl: 1  •  Insulin Pen Needle 32G X 4 MM misc, Use to inject Levemir BID, Disp: 100 each, Rfl: 1  •  Insulin Syringe-Needle U-100 (B-D INS SYR ULTRAFINE 1CC/31G) 31G X 5/16\" 1 ML misc, Use to inject insulin sq nightly, Disp: 100 each, Rfl: 0  •  Lactobacillus (PROBIOTIC ACIDOPHILUS) capsule, Take 1 capsule by mouth., Disp: , Rfl:   •  LEVEMIR 100 UNIT/ML injection, INJECT 60 UNITS UNDER THE SKIN INTO THE APPROPRIATE AREA AS DIRECTED EVERY NIGHT, Disp: 10 mL, Rfl: 5  •  magnesium gluconate (MAGONATE) 500 MG tablet, Take 27 mg by mouth 2 (Two) Times a Day., Disp: , " Rfl:   •  meloxicam (MOBIC) 7.5 MG tablet, TAKE ONE TABLET BY MOUTH TWICE DAILY, Disp: 60 tablet, Rfl: 3  •  metFORMIN (GLUCOPHAGE) 1000 MG tablet, Take 1 tablet by mouth 2 (Two) Times a Day., Disp: 180 tablet, Rfl: 3  •  metoprolol succinate XL (TOPROL-XL) 200 MG 24 hr tablet, Take 1 tablet by mouth Daily., Disp: 90 tablet, Rfl: 1  •  Omega-3 Fatty Acids (FISH OIL) 1000 MG capsule capsule, Take 1 capsule by mouth., Disp: , Rfl:   •  ondansetron (ZOFRAN) 4 MG tablet, Take 4 mg by mouth., Disp: , Rfl:   •  prednisoLONE acetate (PRED FORTE) 1 % ophthalmic suspension, INSTILL ONE Drop IN THE LEFT EYE FOUR TIMES DAILY FOR ONE WEEK, THEN THREE TIMES DAILY FOR ONE WEEK, THEN TWICE DAILY FOR ONE WEEK THEN EVER, Disp: , Rfl: 3  •  senna-docusate (PERICOLACE) 8.6-50 MG per tablet, Take 2 tablets by mouth., Disp: , Rfl:   •  trimethoprim-polymyxin b (POLYTRIM) 15458-4.1 UNIT/ML-% ophthalmic solution, INSTILL ONE drop FOUR TIMES DAILY IN THE LEFT EYE STARTING THREE DAYS prior TO surgery, USE DAY OF surgery AND continue FOR THREE DAYS follo, Disp: , Rfl: 1  •  vitamin C (ASCORBIC ACID) 500 MG tablet, Take 500 mg by mouth., Disp: , Rfl:     Review of Systems   Constitutional: Positive for fatigue.   Eyes: Negative.  Negative for blurred vision.   Respiratory: Negative.    Cardiovascular: Negative.  Negative for chest pain.   Endocrine: Negative for polydipsia, polyphagia and polyuria.   Musculoskeletal: Negative.    Skin: Negative.    Neurological: Negative.         Vitals:    07/24/19 0901   BP: 130/84   Pulse: 72   Resp: 16   Temp: 98.3 °F (36.8 °C)   SpO2: 98%       Objective   Physical Exam   Constitutional: She is oriented to person, place, and time. She appears well-developed and well-nourished.   Cardiovascular: Normal rate, regular rhythm and normal heart sounds.   Pulmonary/Chest: Effort normal and breath sounds normal.   Musculoskeletal: Normal range of motion. She exhibits no edema.   Neurological: She is alert and  oriented to person, place, and time.   Skin: Skin is warm and dry.   Psychiatric:   No acute distress   Vitals reviewed.        Assessment/Plan   Bee was seen today for diabetes.    Diagnoses and all orders for this visit:    Type 2 diabetes mellitus without complication, with long-term current use of insulin (CMS/[facility])  -     POC Glycosylated Hemoglobin (Hb A1C)    Morbid obesity (CMS/[facility])      1. Ms. Tsang did not bring her glucose log with her today. Her A1C is 8.6 which is down from 10.4 three months ago. She is to continue taking Metformin 1000 mg twice a day , Levemir 60 units nightly. Patient had stopped taking the Bydureon because she ran out of the medication and cannot afford it.   2. We discussed using diet and exercise to lower her glucose levels, her weight and blood pressure.  3. She has lost 9 lbs since her last visit.   4. Follow up as needed and in 4 months for her annual medicare wellness exam.

## 2019-09-17 DIAGNOSIS — I10 ESSENTIAL HYPERTENSION: ICD-10-CM

## 2019-09-17 RX ORDER — CLONIDINE HYDROCHLORIDE 0.2 MG/1
TABLET ORAL
Qty: 120 TABLET | Refills: 2 | Status: SHIPPED | OUTPATIENT
Start: 2019-09-17 | End: 2020-03-17 | Stop reason: SDUPTHER

## 2019-09-26 DIAGNOSIS — E78.5 DYSLIPIDEMIA: ICD-10-CM

## 2019-09-26 RX ORDER — FENOFIBRATE 48 MG/1
TABLET, COATED ORAL
Qty: 30 TABLET | Refills: 5 | Status: SHIPPED | OUTPATIENT
Start: 2019-09-26 | End: 2019-11-26 | Stop reason: DRUGHIGH

## 2019-09-26 RX ORDER — BUPROPION HYDROCHLORIDE 300 MG/1
TABLET ORAL
Qty: 30 TABLET | Refills: 5 | Status: SHIPPED | OUTPATIENT
Start: 2019-09-26

## 2019-10-07 RX ORDER — INSULIN DETEMIR 100 [IU]/ML
INJECTION, SOLUTION SUBCUTANEOUS
Qty: 10 ML | Refills: 5 | Status: SHIPPED | OUTPATIENT
Start: 2019-10-07 | End: 2020-01-20

## 2019-11-13 DIAGNOSIS — E78.5 HYPERLIPIDEMIA, UNSPECIFIED HYPERLIPIDEMIA TYPE: Primary | ICD-10-CM

## 2019-11-13 DIAGNOSIS — E11.65 UNCONTROLLED TYPE 2 DIABETES MELLITUS WITH HYPERGLYCEMIA (HCC): ICD-10-CM

## 2019-11-13 DIAGNOSIS — I10 ESSENTIAL HYPERTENSION: ICD-10-CM

## 2019-11-17 ENCOUNTER — RESULTS ENCOUNTER (OUTPATIENT)
Dept: FAMILY MEDICINE CLINIC | Facility: CLINIC | Age: 69
End: 2019-11-17

## 2019-11-17 DIAGNOSIS — I10 ESSENTIAL HYPERTENSION: ICD-10-CM

## 2019-11-17 DIAGNOSIS — E78.5 HYPERLIPIDEMIA, UNSPECIFIED HYPERLIPIDEMIA TYPE: ICD-10-CM

## 2019-11-17 DIAGNOSIS — E11.65 UNCONTROLLED TYPE 2 DIABETES MELLITUS WITH HYPERGLYCEMIA (HCC): ICD-10-CM

## 2019-11-20 LAB
ALBUMIN SERPL-MCNC: 4.5 G/DL (ref 3.5–5.2)
ALBUMIN/CREAT UR: 17.5 MG/G CREAT (ref 0–30)
ALBUMIN/GLOB SERPL: 1.8 G/DL
ALP SERPL-CCNC: 47 U/L (ref 39–117)
ALT SERPL-CCNC: 23 U/L (ref 1–33)
AST SERPL-CCNC: 18 U/L (ref 1–32)
BILIRUB SERPL-MCNC: 0.2 MG/DL (ref 0.2–1.2)
BUN SERPL-MCNC: 21 MG/DL (ref 8–23)
BUN/CREAT SERPL: 28.4 (ref 7–25)
CALCIUM SERPL-MCNC: 10.1 MG/DL (ref 8.6–10.5)
CHLORIDE SERPL-SCNC: 98 MMOL/L (ref 98–107)
CHOLEST SERPL-MCNC: 209 MG/DL (ref 0–200)
CO2 SERPL-SCNC: 28 MMOL/L (ref 22–29)
CREAT SERPL-MCNC: 0.74 MG/DL (ref 0.57–1)
CREAT UR-MCNC: 88.9 MG/DL
ERYTHROCYTE [DISTWIDTH] IN BLOOD BY AUTOMATED COUNT: 12.6 % (ref 12.3–15.4)
GLOBULIN SER CALC-MCNC: 2.5 GM/DL
GLUCOSE SERPL-MCNC: 131 MG/DL (ref 65–99)
HCT VFR BLD AUTO: 40.3 % (ref 34–46.6)
HDLC SERPL-MCNC: 36 MG/DL (ref 40–60)
HGB BLD-MCNC: 13.9 G/DL (ref 12–15.9)
LDLC SERPL CALC-MCNC: ABNORMAL MG/DL
MCH RBC QN AUTO: 30.1 PG (ref 26.6–33)
MCHC RBC AUTO-ENTMCNC: 34.5 G/DL (ref 31.5–35.7)
MCV RBC AUTO: 87.2 FL (ref 79–97)
MICROALBUMIN UR-MCNC: 15.6 UG/ML
PLATELET # BLD AUTO: 248 10*3/MM3 (ref 140–450)
POTASSIUM SERPL-SCNC: 3.9 MMOL/L (ref 3.5–5.2)
PROT SERPL-MCNC: 7 G/DL (ref 6–8.5)
RBC # BLD AUTO: 4.62 10*6/MM3 (ref 3.77–5.28)
SODIUM SERPL-SCNC: 141 MMOL/L (ref 136–145)
TRIGL SERPL-MCNC: 402 MG/DL (ref 0–150)
VLDLC SERPL CALC-MCNC: ABNORMAL MG/DL
WBC # BLD AUTO: 8.41 10*3/MM3 (ref 3.4–10.8)

## 2019-11-26 ENCOUNTER — OFFICE VISIT (OUTPATIENT)
Dept: FAMILY MEDICINE CLINIC | Facility: CLINIC | Age: 69
End: 2019-11-26

## 2019-11-26 VITALS
SYSTOLIC BLOOD PRESSURE: 128 MMHG | OXYGEN SATURATION: 99 % | HEART RATE: 64 BPM | RESPIRATION RATE: 16 BRPM | HEIGHT: 64 IN | WEIGHT: 213 LBS | TEMPERATURE: 97.9 F | BODY MASS INDEX: 36.37 KG/M2 | DIASTOLIC BLOOD PRESSURE: 82 MMHG

## 2019-11-26 DIAGNOSIS — Z00.00 MEDICARE ANNUAL WELLNESS VISIT, SUBSEQUENT: Primary | ICD-10-CM

## 2019-11-26 PROCEDURE — G0439 PPPS, SUBSEQ VISIT: HCPCS | Performed by: NURSE PRACTITIONER

## 2019-11-26 RX ORDER — FENOFIBRATE 145 MG/1
145 TABLET, COATED ORAL DAILY
Qty: 30 TABLET | Refills: 5 | Status: SHIPPED | OUTPATIENT
Start: 2019-11-26

## 2019-11-26 NOTE — PATIENT INSTRUCTIONS
Medicare Wellness  Personal Prevention Plan of Service     Date of Office Visit:  2019  Encounter Provider:  RICHELLE Gibbs  Place of Service:  [facility] INTERNAL MEDICINE  Patient Name: Bee Tsang  :  1950    As part of the Medicare Wellness portion of your visit today, we are providing you with this personalized preventive plan of services (PPPS). This plan is based upon recommendations of the [place] Preventive Services Task Force (USPSTF) and the Advisory Committee on Immunization Practices (ACIP).    This lists the preventive care services that should be considered, and provides dates of when you are due. Items listed as completed are up-to-date and do not require any further intervention.    Health Maintenance   Topic Date Due   • TDAP/TD VACCINES (1 - Tdap) 1970   • ZOSTER VACCINE (2 of 3) 2015   • INFLUENZA VACCINE  2019   • DIABETIC EYE EXAM  2019   • MEDICARE ANNUAL WELLNESS  2019   • HEMOGLOBIN A1C  2020   • DIABETIC FOOT EXAM  2020   • LIPID PANEL  2020   • URINE MICROALBUMIN  2020   • MAMMOGRAM  2020   • COLONOSCOPY  10/12/2026   • HEPATITIS C SCREENING  Completed   • PNEUMOCOCCAL VACCINES (65+ LOW/MEDIUM RISK)  Completed       No orders of the defined types were placed in this encounter.      Return in about 3 months (around 2020) for DM, A1C in the office.

## 2019-11-26 NOTE — PROGRESS NOTES
The ABCs of the Annual Wellness Visit  Subsequent Medicare Wellness Visit    Chief Complaint   Patient presents with   • Medicare Wellness-subsequent       Subjective   History of Present Illness:  Bee Tsang is a 69 y.o. female who presents for a Subsequent Medicare Wellness Visit.    HEALTH RISK ASSESSMENT    Recent Hospitalizations:  No hospitalization(s) within the last year.    Current Medical Providers:  Patient Care Team:  Elizabeth Madrigal APRN as PCP - General (Family Medicine)    Smoking Status:  Social History     Tobacco Use   Smoking Status Never Smoker   Smokeless Tobacco Never Used       Alcohol Consumption:  Social History     Substance and Sexual Activity   Alcohol Use No       Depression Screen:   PHQ-2/PHQ-9 Depression Screening 11/26/2019   Little interest or pleasure in doing things 0   Feeling down, depressed, or hopeless 0   Total Score 0       Fall Risk Screen:  STEADI Fall Risk Assessment was completed, and patient is at LOW risk for falls.Assessment completed on:11/26/2019    Health Habits and Functional and Cognitive Screening:  Functional & Cognitive Status 11/26/2019   Do you have difficulty preparing food and eating? No   Do you have difficulty bathing yourself, getting dressed or grooming yourself? No   Do you have difficulty using the toilet? No   Do you have difficulty moving around from place to place? No   Do you have trouble with steps or getting out of a bed or a chair? No   Current Diet Low Carb Diet   Dental Exam Up to date   Eye Exam Up to date   Exercise (times per week) 0 times per week   Current Exercise Activities Include None   Do you need help using the phone?  No   Are you deaf or do you have serious difficulty hearing?  Yes   Do you need help with transportation? No   Do you need help shopping? No   Do you need help preparing meals?  No   Do you need help with housework?  No   Do you need help with laundry? No   Do you need help taking your medications? No   Do  you need help managing money? No   Do you ever drive or ride in a car without wearing a seat belt? No   Have you felt unusual stress, anger or loneliness in the last month? No   Who do you live with? Other   If you need help, do you have trouble finding someone available to you? No   Have you been bothered in the last four weeks by sexual problems? No   Do you have difficulty concentrating, remembering or making decisions? No         Does the patient have evidence of cognitive impairment? No    Asprin use counseling:Taking ASA appropriately as indicated    Age-appropriate Screening Schedule:  Refer to the list below for future screening recommendations based on patient's age, sex and/or medical conditions. Orders for these recommended tests are listed in the plan section. The patient has been provided with a written plan.    Health Maintenance   Topic Date Due   • TDAP/TD VACCINES (1 - Tdap) 11/18/1970   • ZOSTER VACCINE (2 of 3) 06/22/2015   • INFLUENZA VACCINE  08/01/2019   • DIABETIC EYE EXAM  09/05/2019   • HEMOGLOBIN A1C  01/24/2020   • DIABETIC FOOT EXAM  03/12/2020   • LIPID PANEL  11/19/2020   • URINE MICROALBUMIN  11/19/2020   • MAMMOGRAM  12/05/2020   • COLONOSCOPY  10/12/2026   • PNEUMOCOCCAL VACCINES (65+ LOW/MEDIUM RISK)  Completed          The following portions of the patient's history were reviewed and updated as appropriate: allergies, current medications, past family history, past medical history, past social history, past surgical history and problem list.    Outpatient Medications Prior to Visit   Medication Sig Dispense Refill   • acetaminophen (TYLENOL ARTHRITIS PAIN) 650 MG 8 hr tablet Take 650 mg by mouth Every 8 (Eight) Hours As Needed for Mild Pain (1-3).     • albuterol (PROVENTIL HFA;VENTOLIN HFA) 108 (90 BASE) MCG/ACT inhaler Inhale 2 puffs Every 4 (Four) Hours As Needed for wheezing or shortness of air. 18 g 3   • aspirin 81 MG EC tablet Take 81 mg by mouth.     • B Complex Vitamins (B  "COMPLEX-B12 PO) Take 1 tablet by mouth.     • buPROPion XL (WELLBUTRIN XL) 300 MG 24 hr tablet TAKE ONE TABLET BY MOUTH EVERY DAY 30 tablet 5   • Cholecalciferol (VITAMIN D) 1000 units tablet Take 1,000 Units by mouth.     • CloNIDine (CATAPRES) 0.2 MG tablet TAKE ONE TABLET BY MOUTH TWICE DAILY 120 tablet 2   • EPINEPHrine (EPIPEN) 0.3 MG/0.3ML solution auto-injector injection Inject  as directed.     • fenofibrate (TRICOR) 48 MG tablet TAKE ONE TABLET BY MOUTH EVERY DAY 30 tablet 5   • hydrochlorothiazide (HYDRODIURIL) 50 MG tablet Take 1 tablet by mouth Daily. 90 tablet 1   • hydrOXYzine (VISTARIL) 25 MG capsule Take 1 capsule by mouth 3 (Three) Times a Day As Needed for Itching. 90 capsule 5   • Insulin Pen Needle 32G X 4 MM misc Use to inject Levemir  each 1   • Insulin Syringe-Needle U-100 (B-D INS SYR ULTRAFINE 1CC/31G) 31G X 5/16\" 1 ML misc Use to inject insulin sq nightly 100 each 0   • Lactobacillus (PROBIOTIC ACIDOPHILUS) capsule Take 1 capsule by mouth.     • LEVEMIR 100 UNIT/ML injection INJECT 60 UNITS UNDER THE SKIN INTO THE APPROPRIATE AREA AS DIRECTED EVERY NIGHT 10 mL 5   • magnesium gluconate (MAGONATE) 500 MG tablet Take 27 mg by mouth 2 (Two) Times a Day.     • meloxicam (MOBIC) 7.5 MG tablet TAKE ONE TABLET BY MOUTH TWICE DAILY 60 tablet 3   • metFORMIN (GLUCOPHAGE) 1000 MG tablet Take 1 tablet by mouth 2 (Two) Times a Day. 180 tablet 3   • metoprolol succinate XL (TOPROL-XL) 200 MG 24 hr tablet Take 1 tablet by mouth Daily. 90 tablet 1   • Omega-3 Fatty Acids (FISH OIL) 1000 MG capsule capsule Take 1 capsule by mouth.     • ondansetron (ZOFRAN) 4 MG tablet Take 4 mg by mouth.     • senna-docusate (PERICOLACE) 8.6-50 MG per tablet Take 2 tablets by mouth.     • vitamin C (ASCORBIC ACID) 500 MG tablet Take 500 mg by mouth.       No facility-administered medications prior to visit.        Patient Active Problem List   Diagnosis   • Abnormal electrocardiogram   • Asthma   • Depression   • " "Uncontrolled type 2 diabetes mellitus (CMS/HCC)   • Dyslipidemia   • Fatigue   • Hearing loss   • Essential hypertension   • Morbid obesity (CMS/HCC)   • Multiple joint pain   • Polyphagia(783.6)   • Snoring   • Dietary counseling   • Unintended weight gain   • Constipation   • Regurgitation   • Hyperlipidemia       Advanced Care Planning:  Patient does not have an advance directive - information provided to the patient today    Review of Systems   Constitutional: Negative.    Respiratory: Negative.    Cardiovascular: Negative.    Gastrointestinal: Negative.    Musculoskeletal: Negative.    Skin: Negative.    Neurological: Negative.    Psychiatric/Behavioral: Negative.    All other systems reviewed and are negative.      Compared to one year ago, the patient feels her physical health is better.  Compared to one year ago, the patient feels her mental health is better.    Reviewed chart for potential of high risk medication in the elderly: yes  Reviewed chart for potential of harmful drug interactions in the elderly:yes    Objective         Vitals:    11/26/19 1434   BP: 128/82   BP Location: Right arm   Patient Position: Sitting   Cuff Size: Adult   Pulse: 64   Resp: 16   Temp: 97.9 °F (36.6 °C)   TempSrc: Oral   SpO2: 99%   Weight: 96.6 kg (213 lb)   Height: 161.3 cm (63.5\")       Body mass index is 37.14 kg/m².  Discussed the patient's BMI with her. The BMI is above average; BMI management plan is completed.    Physical Exam   Constitutional: She is oriented to person, place, and time. She appears well-developed and well-nourished.   Neurological: She is alert and oriented to person, place, and time.   Skin: Skin is warm and dry.   Psychiatric:   No acute distress   Vitals reviewed.      Lab Results   Component Value Date     (H) 11/19/2019    CHLPL 209 (H) 11/19/2019    TRIG 402 (H) 11/19/2019    HDL 36 (L) 11/19/2019    LDL CANCELED 11/19/2019    VLDL CANCELED 11/19/2019        Assessment/Plan   Medicare " Risks and Personalized Health Plan  CMS Preventative Services Quick Reference  Advance Directive Discussion  Obesity/Overweight     The above risks/problems have been discussed with the patient.  Pertinent information has been shared with the patient in the After Visit Summary.  Follow up plans and orders are seen below in the Assessment/Plan Section.    There are no diagnoses linked to this encounter.  Follow Up:  No Follow-up on file.     An After Visit Summary and PPPS were given to the patient.

## 2019-12-17 RX ORDER — METOPROLOL SUCCINATE 200 MG/1
200 TABLET, EXTENDED RELEASE ORAL DAILY
Qty: 90 TABLET | Refills: 1 | Status: SHIPPED | OUTPATIENT
Start: 2019-12-17

## 2019-12-23 RX ORDER — HYDROCHLOROTHIAZIDE 50 MG/1
TABLET ORAL
Qty: 90 TABLET | Refills: 1 | Status: SHIPPED | OUTPATIENT
Start: 2019-12-23

## 2020-01-20 RX ORDER — INSULIN DETEMIR 100 [IU]/ML
INJECTION, SOLUTION SUBCUTANEOUS
Qty: 10 ML | Refills: 5 | Status: SHIPPED | OUTPATIENT
Start: 2020-01-20

## 2020-03-11 DIAGNOSIS — E11.65 UNCONTROLLED TYPE 2 DIABETES MELLITUS WITH HYPERGLYCEMIA (HCC): ICD-10-CM

## 2020-03-17 DIAGNOSIS — E11.65 UNCONTROLLED TYPE 2 DIABETES MELLITUS WITH HYPERGLYCEMIA (HCC): ICD-10-CM

## 2020-03-17 DIAGNOSIS — I10 ESSENTIAL HYPERTENSION: ICD-10-CM

## 2020-03-17 RX ORDER — CLONIDINE HYDROCHLORIDE 0.2 MG/1
0.2 TABLET ORAL 2 TIMES DAILY
Qty: 60 TABLET | Refills: 0 | Status: SHIPPED | OUTPATIENT
Start: 2020-03-17

## 2020-03-27 RX ORDER — BUPROPION HYDROCHLORIDE 300 MG/1
TABLET ORAL
Qty: 30 TABLET | Refills: 5 | OUTPATIENT
Start: 2020-03-27

## 2020-06-08 RX ORDER — HYDROCHLOROTHIAZIDE 50 MG/1
TABLET ORAL
Qty: 90 TABLET | Refills: 1 | OUTPATIENT
Start: 2020-06-08

## 2020-06-08 RX ORDER — METOPROLOL SUCCINATE 200 MG/1
200 TABLET, EXTENDED RELEASE ORAL DAILY
Qty: 90 TABLET | Refills: 1 | OUTPATIENT
Start: 2020-06-08

## 2021-03-10 DIAGNOSIS — E11.65 UNCONTROLLED TYPE 2 DIABETES MELLITUS WITH HYPERGLYCEMIA (HCC): ICD-10-CM

## 2021-03-16 ENCOUNTER — BULK ORDERING (OUTPATIENT)
Dept: CASE MANAGEMENT | Facility: OTHER | Age: 71
End: 2021-03-16

## 2021-03-16 DIAGNOSIS — Z23 IMMUNIZATION DUE: ICD-10-CM
